# Patient Record
Sex: MALE | Race: WHITE | HISPANIC OR LATINO | Employment: FULL TIME | URBAN - METROPOLITAN AREA
[De-identification: names, ages, dates, MRNs, and addresses within clinical notes are randomized per-mention and may not be internally consistent; named-entity substitution may affect disease eponyms.]

---

## 2021-08-15 ENCOUNTER — HOSPITAL ENCOUNTER (INPATIENT)
Facility: MEDICAL CENTER | Age: 56
LOS: 16 days | DRG: 177 | End: 2021-08-31
Attending: INTERNAL MEDICINE | Admitting: HOSPITALIST
Payer: OTHER GOVERNMENT

## 2021-08-15 ENCOUNTER — APPOINTMENT (OUTPATIENT)
Dept: RADIOLOGY | Facility: MEDICAL CENTER | Age: 56
DRG: 177 | End: 2021-08-15
Attending: HOSPITALIST
Payer: OTHER GOVERNMENT

## 2021-08-15 DIAGNOSIS — J96.01 ACUTE RESPIRATORY FAILURE WITH HYPOXIA (HCC): ICD-10-CM

## 2021-08-15 DIAGNOSIS — J12.82 PNEUMONIA DUE TO COVID-19 VIRUS: ICD-10-CM

## 2021-08-15 DIAGNOSIS — U07.1 PNEUMONIA DUE TO COVID-19 VIRUS: ICD-10-CM

## 2021-08-15 LAB
ALBUMIN SERPL BCP-MCNC: 3 G/DL (ref 3.2–4.9)
ALBUMIN/GLOB SERPL: 0.9 G/DL
ALP SERPL-CCNC: 54 U/L (ref 30–99)
ALT SERPL-CCNC: 156 U/L (ref 2–50)
ANION GAP SERPL CALC-SCNC: 13 MMOL/L (ref 7–16)
AST SERPL-CCNC: 153 U/L (ref 12–45)
BASOPHILS # BLD AUTO: 0 % (ref 0–1.8)
BASOPHILS # BLD: 0 K/UL (ref 0–0.12)
BILIRUB SERPL-MCNC: 0.7 MG/DL (ref 0.1–1.5)
BUN SERPL-MCNC: 21 MG/DL (ref 8–22)
CALCIUM SERPL-MCNC: 7.9 MG/DL (ref 8.4–10.2)
CHLORIDE SERPL-SCNC: 101 MMOL/L (ref 96–112)
CO2 SERPL-SCNC: 21 MMOL/L (ref 20–33)
CREAT SERPL-MCNC: 0.76 MG/DL (ref 0.5–1.4)
CRP SERPL HS-MCNC: 19.85 MG/DL (ref 0–0.75)
D DIMER PPP IA.FEU-MCNC: 1.54 UG/ML (FEU) (ref 0–0.5)
EOSINOPHIL # BLD AUTO: 0 K/UL (ref 0–0.51)
EOSINOPHIL NFR BLD: 0 % (ref 0–6.9)
ERYTHROCYTE [DISTWIDTH] IN BLOOD BY AUTOMATED COUNT: 41 FL (ref 35.9–50)
GLOBULIN SER CALC-MCNC: 3.4 G/DL (ref 1.9–3.5)
GLUCOSE SERPL-MCNC: 141 MG/DL (ref 65–99)
HCT VFR BLD AUTO: 42.4 % (ref 42–52)
HGB BLD-MCNC: 14.8 G/DL (ref 14–18)
LYMPHOCYTES # BLD AUTO: 0.47 K/UL (ref 1–4.8)
LYMPHOCYTES NFR BLD: 7 % (ref 22–41)
MANUAL DIFF BLD: NORMAL
MCH RBC QN AUTO: 29.8 PG (ref 27–33)
MCHC RBC AUTO-ENTMCNC: 34.9 G/DL (ref 33.7–35.3)
MCV RBC AUTO: 85.3 FL (ref 81.4–97.8)
MONOCYTES # BLD AUTO: 0.2 K/UL (ref 0–0.85)
MONOCYTES NFR BLD AUTO: 3 % (ref 0–13.4)
NEUTROPHILS # BLD AUTO: 6.03 K/UL (ref 1.82–7.42)
NEUTROPHILS NFR BLD: 88 % (ref 44–72)
NEUTS BAND NFR BLD MANUAL: 2 % (ref 0–10)
NRBC # BLD AUTO: 0 K/UL
NRBC BLD-RTO: 0 /100 WBC
PLATELET # BLD AUTO: 156 K/UL (ref 164–446)
PLATELET BLD QL SMEAR: NORMAL
PMV BLD AUTO: 10.4 FL (ref 9–12.9)
POTASSIUM SERPL-SCNC: 3.7 MMOL/L (ref 3.6–5.5)
PROCALCITONIN SERPL-MCNC: 0.32 NG/ML
PROT SERPL-MCNC: 6.4 G/DL (ref 6–8.2)
RBC # BLD AUTO: 4.97 M/UL (ref 4.7–6.1)
RBC BLD AUTO: NORMAL
SODIUM SERPL-SCNC: 135 MMOL/L (ref 135–145)
WBC # BLD AUTO: 6.7 K/UL (ref 4.8–10.8)

## 2021-08-15 PROCEDURE — 93005 ELECTROCARDIOGRAM TRACING: CPT | Performed by: HOSPITALIST

## 2021-08-15 PROCEDURE — 85027 COMPLETE CBC AUTOMATED: CPT

## 2021-08-15 PROCEDURE — 99223 1ST HOSP IP/OBS HIGH 75: CPT | Mod: AI | Performed by: HOSPITALIST

## 2021-08-15 PROCEDURE — 85007 BL SMEAR W/DIFF WBC COUNT: CPT

## 2021-08-15 PROCEDURE — 80053 COMPREHEN METABOLIC PANEL: CPT

## 2021-08-15 PROCEDURE — 94760 N-INVAS EAR/PLS OXIMETRY 1: CPT

## 2021-08-15 PROCEDURE — 85379 FIBRIN DEGRADATION QUANT: CPT

## 2021-08-15 PROCEDURE — 94002 VENT MGMT INPAT INIT DAY: CPT

## 2021-08-15 PROCEDURE — 86140 C-REACTIVE PROTEIN: CPT

## 2021-08-15 PROCEDURE — 8E0ZXY6 ISOLATION: ICD-10-PCS | Performed by: HOSPITALIST

## 2021-08-15 PROCEDURE — 94660 CPAP INITIATION&MGMT: CPT

## 2021-08-15 PROCEDURE — 5A09357 ASSISTANCE WITH RESPIRATORY VENTILATION, LESS THAN 24 CONSECUTIVE HOURS, CONTINUOUS POSITIVE AIRWAY PRESSURE: ICD-10-PCS | Performed by: HOSPITALIST

## 2021-08-15 PROCEDURE — 770022 HCHG ROOM/CARE - ICU (200)

## 2021-08-15 PROCEDURE — 84145 PROCALCITONIN (PCT): CPT

## 2021-08-15 PROCEDURE — 71045 X-RAY EXAM CHEST 1 VIEW: CPT

## 2021-08-15 RX ORDER — GUAIFENESIN 600 MG/1
600 TABLET, EXTENDED RELEASE ORAL 2 TIMES DAILY PRN
Status: DISCONTINUED | OUTPATIENT
Start: 2021-08-15 | End: 2021-08-26

## 2021-08-15 RX ORDER — LABETALOL HYDROCHLORIDE 5 MG/ML
10 INJECTION, SOLUTION INTRAVENOUS EVERY 6 HOURS PRN
Status: DISCONTINUED | OUTPATIENT
Start: 2021-08-15 | End: 2021-08-31 | Stop reason: HOSPADM

## 2021-08-15 RX ORDER — DEXAMETHASONE 4 MG/1
6 TABLET ORAL DAILY
Status: COMPLETED | OUTPATIENT
Start: 2021-08-16 | End: 2021-08-25

## 2021-08-15 RX ORDER — ONDANSETRON 4 MG/1
4 TABLET, ORALLY DISINTEGRATING ORAL EVERY 6 HOURS PRN
Status: DISCONTINUED | OUTPATIENT
Start: 2021-08-15 | End: 2021-08-31 | Stop reason: HOSPADM

## 2021-08-15 RX ORDER — ZINC SULFATE 50(220)MG
220 CAPSULE ORAL DAILY
Status: DISCONTINUED | OUTPATIENT
Start: 2021-08-16 | End: 2021-08-16

## 2021-08-15 RX ORDER — ONDANSETRON 2 MG/ML
4 INJECTION INTRAMUSCULAR; INTRAVENOUS EVERY 6 HOURS PRN
Status: DISCONTINUED | OUTPATIENT
Start: 2021-08-15 | End: 2021-08-31 | Stop reason: HOSPADM

## 2021-08-15 RX ORDER — HYDRALAZINE HYDROCHLORIDE 20 MG/ML
10 INJECTION INTRAMUSCULAR; INTRAVENOUS EVERY 6 HOURS PRN
Status: DISCONTINUED | OUTPATIENT
Start: 2021-08-15 | End: 2021-08-31 | Stop reason: HOSPADM

## 2021-08-15 RX ORDER — ACETAMINOPHEN 325 MG/1
650 TABLET ORAL EVERY 6 HOURS PRN
Status: DISCONTINUED | OUTPATIENT
Start: 2021-08-15 | End: 2021-08-31 | Stop reason: HOSPADM

## 2021-08-15 RX ORDER — ASCORBIC ACID 500 MG
1000 TABLET ORAL DAILY
Status: DISCONTINUED | OUTPATIENT
Start: 2021-08-15 | End: 2021-08-16

## 2021-08-15 RX ORDER — VITAMIN B COMPLEX
1000 TABLET ORAL DAILY
Status: DISCONTINUED | OUTPATIENT
Start: 2021-08-16 | End: 2021-08-16

## 2021-08-15 ASSESSMENT — LIFESTYLE VARIABLES
EVER HAD A DRINK FIRST THING IN THE MORNING TO STEADY YOUR NERVES TO GET RID OF A HANGOVER: NO
HOW MANY TIMES IN THE PAST YEAR HAVE YOU HAD 5 OR MORE DRINKS IN A DAY: 0
HAVE PEOPLE ANNOYED YOU BY CRITICIZING YOUR DRINKING: NO
CONSUMPTION TOTAL: NEGATIVE
AVERAGE NUMBER OF DAYS PER WEEK YOU HAVE A DRINK CONTAINING ALCOHOL: 0
TOTAL SCORE: 0
ALCOHOL_USE: NO
SUBSTANCE_ABUSE: 0
HAVE YOU EVER FELT YOU SHOULD CUT DOWN ON YOUR DRINKING: NO
ON A TYPICAL DAY WHEN YOU DRINK ALCOHOL HOW MANY DRINKS DO YOU HAVE: 0
EVER FELT BAD OR GUILTY ABOUT YOUR DRINKING: NO

## 2021-08-15 ASSESSMENT — ENCOUNTER SYMPTOMS
WHEEZING: 0
FOCAL WEAKNESS: 0
MUSCULOSKELETAL NEGATIVE: 1
HEARTBURN: 0
NERVOUS/ANXIOUS: 0
HEADACHES: 0
CONSTIPATION: 0
CHILLS: 1
NAUSEA: 0
ABDOMINAL PAIN: 0
DEPRESSION: 0
PALPITATIONS: 0
BLOOD IN STOOL: 0
PSYCHIATRIC NEGATIVE: 1
FEVER: 1
DIZZINESS: 0
VOMITING: 0
DOUBLE VISION: 0
DIAPHORESIS: 0
SHORTNESS OF BREATH: 1
HEMOPTYSIS: 0
NEUROLOGICAL NEGATIVE: 1
GASTROINTESTINAL NEGATIVE: 1
SEIZURES: 0
DIARRHEA: 0
CARDIOVASCULAR NEGATIVE: 1
COUGH: 1
EYES NEGATIVE: 1
LOSS OF CONSCIOUSNESS: 0
BRUISES/BLEEDS EASILY: 0

## 2021-08-15 ASSESSMENT — COGNITIVE AND FUNCTIONAL STATUS - GENERAL
DRESSING REGULAR UPPER BODY CLOTHING: A LITTLE
DAILY ACTIVITIY SCORE: 22
SUGGESTED CMS G CODE MODIFIER MOBILITY: CH
SUGGESTED CMS G CODE MODIFIER DAILY ACTIVITY: CJ
MOBILITY SCORE: 24
HELP NEEDED FOR BATHING: A LITTLE

## 2021-08-15 ASSESSMENT — PATIENT HEALTH QUESTIONNAIRE - PHQ9
2. FEELING DOWN, DEPRESSED, IRRITABLE, OR HOPELESS: NOT AT ALL
SUM OF ALL RESPONSES TO PHQ9 QUESTIONS 1 AND 2: 0
1. LITTLE INTEREST OR PLEASURE IN DOING THINGS: NOT AT ALL

## 2021-08-15 ASSESSMENT — PULMONARY FUNCTION TESTS: EPAP_CMH2O: 8

## 2021-08-15 ASSESSMENT — PAIN DESCRIPTION - PAIN TYPE: TYPE: ACUTE PAIN

## 2021-08-16 PROBLEM — D69.6 THROMBOCYTOPENIA (HCC): Status: ACTIVE | Noted: 2021-08-16

## 2021-08-16 LAB — EKG IMPRESSION: NORMAL

## 2021-08-16 PROCEDURE — 700102 HCHG RX REV CODE 250 W/ 637 OVERRIDE(OP): Performed by: HOSPITALIST

## 2021-08-16 PROCEDURE — 700111 HCHG RX REV CODE 636 W/ 250 OVERRIDE (IP): Performed by: INTERNAL MEDICINE

## 2021-08-16 PROCEDURE — 94640 AIRWAY INHALATION TREATMENT: CPT

## 2021-08-16 PROCEDURE — 770022 HCHG ROOM/CARE - ICU (200)

## 2021-08-16 PROCEDURE — 93010 ELECTROCARDIOGRAM REPORT: CPT | Performed by: INTERNAL MEDICINE

## 2021-08-16 PROCEDURE — 94660 CPAP INITIATION&MGMT: CPT

## 2021-08-16 PROCEDURE — 99291 CRITICAL CARE FIRST HOUR: CPT | Performed by: INTERNAL MEDICINE

## 2021-08-16 PROCEDURE — 94003 VENT MGMT INPAT SUBQ DAY: CPT

## 2021-08-16 PROCEDURE — XW033H5 INTRODUCTION OF TOCILIZUMAB INTO PERIPHERAL VEIN, PERCUTANEOUS APPROACH, NEW TECHNOLOGY GROUP 5: ICD-10-PCS | Performed by: INTERNAL MEDICINE

## 2021-08-16 PROCEDURE — 700105 HCHG RX REV CODE 258: Performed by: INTERNAL MEDICINE

## 2021-08-16 PROCEDURE — 94760 N-INVAS EAR/PLS OXIMETRY 1: CPT

## 2021-08-16 PROCEDURE — 700111 HCHG RX REV CODE 636 W/ 250 OVERRIDE (IP): Performed by: HOSPITALIST

## 2021-08-16 PROCEDURE — A9270 NON-COVERED ITEM OR SERVICE: HCPCS | Performed by: HOSPITALIST

## 2021-08-16 RX ADMIN — TOCILIZUMAB 640 MG: 20 INJECTION, SOLUTION, CONCENTRATE INTRAVENOUS at 10:48

## 2021-08-16 RX ADMIN — ZINC SULFATE 220 MG (50 MG) CAPSULE 220 MG: CAPSULE at 06:11

## 2021-08-16 RX ADMIN — OXYCODONE HYDROCHLORIDE AND ACETAMINOPHEN 1000 MG: 500 TABLET ORAL at 06:11

## 2021-08-16 RX ADMIN — ENOXAPARIN SODIUM 40 MG: 40 INJECTION SUBCUTANEOUS at 17:43

## 2021-08-16 RX ADMIN — DEXAMETHASONE 6 MG: 4 TABLET ORAL at 06:11

## 2021-08-16 RX ADMIN — Medication 1000 UNITS: at 06:11

## 2021-08-16 ASSESSMENT — ENCOUNTER SYMPTOMS
EYES NEGATIVE: 1
SHORTNESS OF BREATH: 1
MUSCULOSKELETAL NEGATIVE: 1
WEAKNESS: 1
PSYCHIATRIC NEGATIVE: 1
GASTROINTESTINAL NEGATIVE: 1
CARDIOVASCULAR NEGATIVE: 1

## 2021-08-16 ASSESSMENT — PAIN DESCRIPTION - PAIN TYPE
TYPE: ACUTE PAIN

## 2021-08-16 ASSESSMENT — PULMONARY FUNCTION TESTS
EPAP_CMH2O: 8
EPAP_CMH2O: 8

## 2021-08-16 NOTE — PROGRESS NOTES
Pulmonary Progress Note    Date of admission  8/15/2021    Chief Complaint  56 y.o. male admitted 8/15/2021 with Covid pna    Hospital Course  55 yo male transferred from [place] with worsening SOB dx covid 8/8/21 and b/l infiltrates and high o2 requirement. Required BIPAP and transferred to ICU for further management    Interval Problem Update  Reviewed last 24 hour events:  Chart review from the past 24 hours includes imaging, laboratory studies, vital signs and notes available.  Pertinent data for today    Cardiac:  BP vitals stable  Pulm:  12/8 60% BIPAP  Neuro:  intact  Heme:  Not bleeding  I/O:  340/900  ID: COVID pna  On decadron 8/15  CRP 19.85  GI/endo:  Able to eat  Labs/Imaging:  reviewed  Lines:  none  Mobility:  Prone q 2 hrs  Symptoms:see below      Review of Systems  Review of Systems   Constitutional: Positive for malaise/fatigue.   HENT: Negative.    Eyes: Negative.    Respiratory: Positive for shortness of breath.    Cardiovascular: Negative.    Gastrointestinal: Negative.    Genitourinary: Negative.    Musculoskeletal: Negative.    Skin: Negative.    Neurological: Positive for weakness.   Endo/Heme/Allergies: Negative.    Psychiatric/Behavioral: Negative.         Vital Signs for last 24 hours   Temp:  [36.2 °C (97.2 °F)-36.7 °C (98.1 °F)] 36.4 °C (97.5 °F)  Pulse:  [57-82] 57  Resp:  [12-55] 21  BP: (116-165)/() 116/78  SpO2:  [88 %-99 %] 96 %         Physical Exam   Physical Exam  Constitutional:       Appearance: He is ill-appearing.   HENT:      Head: Normocephalic and atraumatic.      Nose: Nose normal.   Eyes:      Pupils: Pupils are equal, round, and reactive to light.   Cardiovascular:      Rate and Rhythm: Normal rate.   Pulmonary:      Comments: tacypnea  On BIPAP  Abdominal:      General: Bowel sounds are normal.      Palpations: Abdomen is soft.   Musculoskeletal:         General: No tenderness.   Skin:     General: Skin is warm and dry.   Neurological:      General: No  focal deficit present.      Mental Status: He is alert and oriented to person, place, and time.   Psychiatric:         Mood and Affect: Mood normal.         Behavior: Behavior normal.         Medications  Current Facility-Administered Medications   Medication Dose Route Frequency Provider Last Rate Last Admin   • ondansetron (ZOFRAN) syringe/vial injection 4 mg  4 mg Intravenous Q6HRS PRN Rohini Lr M.D.       • ondansetron (ZOFRAN ODT) dispertab 4 mg  4 mg Oral Q6HRS PRN Rohini Lr M.D.       • enoxaparin (LOVENOX) inj 40 mg  40 mg Subcutaneous DAILY Rohini Lr M.D.       • acetaminophen (Tylenol) tablet 650 mg  650 mg Oral Q6HRS PRN Rohini Lr M.D.       • labetalol (NORMODYNE/TRANDATE) injection 10 mg  10 mg Intravenous Q6HRS PRN Rohini Lr M.D.       • hydrALAZINE (APRESOLINE) injection 10 mg  10 mg Intravenous Q6HRS PRN Rohini Lr M.D.       • guaiFENesin ER (MUCINEX) tablet 600 mg  600 mg Oral BID PRN Rohini Lr M.D.       • dexamethasone (DECADRON) tablet 6 mg  6 mg Oral DAILY Rohini Lr M.D.   6 mg at 08/16/21 0611       Fluids    Intake/Output Summary (Last 24 hours) at 8/16/2021 1637  Last data filed at 8/16/2021 1400  Gross per 24 hour   Intake 340 ml   Output 900 ml   Net -560 ml       Laboratory          Recent Labs     08/15/21  2200   SODIUM 135   POTASSIUM 3.7   CHLORIDE 101   CO2 21   BUN 21   CREATININE 0.76   CALCIUM 7.9*     Recent Labs     08/15/21  2200   ALTSGPT 156*   ASTSGOT 153*   ALKPHOSPHAT 54   TBILIRUBIN 0.7   GLUCOSE 141*     Recent Labs     08/15/21  2200   WBC 6.7   NEUTSPOLYS 88.00*   LYMPHOCYTES 7.00*   MONOCYTES 3.00   EOSINOPHILS 0.00   BASOPHILS 0.00   ASTSGOT 153*   ALTSGPT 156*   ALKPHOSPHAT 54   TBILIRUBIN 0.7     Recent Labs     08/15/21  2200   RBC 4.97   HEMOGLOBIN 14.8   HEMATOCRIT 42.4   PLATELETCT 156*       Imaging  B/l infiltrates mainly peripherally    Assessment/Plan  * Pneumonia due to COVID-19 virus  Assessment & Plan  With  hypoxemia needing bipap- transition to hiflo  Does not qualify for remdesivir  Qualifies for tocilimuzab and counseled pt on tocilimuzab  On decadron started 8/15  D dimer high - doppler LE  Strict droplet isolation  pronning q 2 hrs with 16 hrs if possible    Thrombocytopenia (HCC)  Assessment & Plan  Platelets 156 k  monitor       VTE:  Lovenox  Ulcer: Not Indicated  Lines: None    I have performed a physical exam and reviewed and updated ROS and Plan today (8/16/2021). In review of yesterday's note (8/15/2021), there are no changes except as documented above.     Discussed patient condition and risk of morbidity and/or mortality with Charge nurse / hot rounds  The patient remains critically ill.  Critical care time = 38 minutes in directly providing and coordinating critical care and extensive data review.  No time overlap and excludes procedures.

## 2021-08-16 NOTE — PROGRESS NOTES
Assumed patient care from Meeta BLACKWELL. Patient assessed and denies pain. Call light and belongings within reach. Patient's daughter updated on patient's status. All safety measures in place.

## 2021-08-16 NOTE — H&P
Hospital Medicine History & Physical Note    Date of Service  8/15/2021    Primary Care Physician  No primary care provider on file.    Consultants  critical care    Specialist Names: Aaron    Code Status  Full Code    Chief Complaint  Shortness of breath    History of Presenting Illness  Venkat Thomson is a 56 y.o. male who presented 8/15/2021 with shortness of breath to the [facility] emergency room today.  On 8/8/2021 he was diagnosed with COVID-19 pneumonia and then was sent home on 8/9/2021.  At home the patient decompensated and now is with bilateral worsening pneumonia as well as shortness of breath.  Patient thus was transferred to [facility] for higher level of care.  Patient currently is admitted to the ICU due to high oxygen requirements and requiring BiPAP.  Patient will be started on Decadron, patient will be started also on vitamin D, zinc and vitamin C.  The patient at this point will be on Lovenox for anticoagulation.  BiPAP settings will be adjusted to keep oxygen saturations above 90%..    I discussed the plan of care with patient, family and bedside RN.    Review of Systems  Review of Systems   Constitutional: Positive for chills, fever and malaise/fatigue. Negative for diaphoresis.   HENT: Negative.    Eyes: Negative.  Negative for double vision.   Respiratory: Positive for cough and shortness of breath. Negative for hemoptysis and wheezing.    Cardiovascular: Negative.  Negative for chest pain, palpitations and leg swelling.   Gastrointestinal: Negative.  Negative for abdominal pain, blood in stool, constipation, diarrhea, heartburn, nausea and vomiting.   Genitourinary: Negative.  Negative for frequency, hematuria and urgency.   Musculoskeletal: Negative.  Negative for joint pain.   Skin: Negative.  Negative for itching and rash.   Neurological: Negative.  Negative for dizziness, focal weakness, seizures, loss of consciousness and headaches.   Endo/Heme/Allergies: Negative.  Does not  bruise/bleed easily.   Psychiatric/Behavioral: Negative.  Negative for depression, substance abuse and suicidal ideas. The patient is not nervous/anxious.    All other systems reviewed and are negative.      Past Medical History  Patient reports no past medical problems and does not take any medications.    Surgical History  Reports no past surgical problems.    Family History  Family history reviewed and noncontributory.  Family history reviewed with patient. There is no family history that is pertinent to the chief complaint.     Social History  Patient reports no smoking, no alcohol no drug use.    Allergies  No Known Allergies    Medications  None       Physical Exam  Temp:  [36.3 °C (97.3 °F)] 36.3 °C (97.3 °F)  Pulse:  [67-72] 67  Resp:  [31-35] 35  SpO2:  [93 %-95 %] 95 %    Physical Exam  Vitals and nursing note reviewed. Exam conducted with a chaperone present.   Constitutional:       General: He is not in acute distress.     Appearance: Normal appearance. He is well-developed and normal weight. He is not ill-appearing, toxic-appearing or diaphoretic.      Interventions: Face mask in place.   HENT:      Head: Normocephalic and atraumatic.      Right Ear: External ear normal.      Left Ear: External ear normal.      Nose: Nose normal.      Mouth/Throat:      Mouth: Mucous membranes are moist.      Pharynx: Oropharynx is clear.   Eyes:      Extraocular Movements: Extraocular movements intact.      Conjunctiva/sclera: Conjunctivae normal.      Pupils: Pupils are equal, round, and reactive to light.   Neck:      Thyroid: No thyromegaly.      Vascular: No JVD.   Cardiovascular:      Rate and Rhythm: Normal rate and regular rhythm.      Pulses: Normal pulses.      Heart sounds: Normal heart sounds.   Pulmonary:      Effort: Accessory muscle usage present.      Breath sounds: Decreased air movement present. Examination of the right-upper field reveals decreased breath sounds. Examination of the left-upper field  reveals decreased breath sounds. Examination of the right-middle field reveals decreased breath sounds and rhonchi. Examination of the left-middle field reveals decreased breath sounds and rhonchi. Examination of the right-lower field reveals decreased breath sounds and rhonchi. Examination of the left-lower field reveals decreased breath sounds and rhonchi. Decreased breath sounds and rhonchi present.   Chest:      Chest wall: No tenderness.   Abdominal:      General: Abdomen is flat. Bowel sounds are normal. There is no distension.      Palpations: Abdomen is soft. There is no mass.      Tenderness: There is no abdominal tenderness. There is no guarding or rebound.   Musculoskeletal:         General: Normal range of motion.      Cervical back: Normal range of motion and neck supple.      Right lower leg: No edema.      Left lower leg: No edema.   Lymphadenopathy:      Cervical: No cervical adenopathy.   Skin:     General: Skin is warm and dry.      Capillary Refill: Capillary refill takes more than 3 seconds.      Findings: No rash.   Neurological:      General: No focal deficit present.      Mental Status: He is alert and oriented to person, place, and time. Mental status is at baseline.      GCS: GCS eye subscore is 4. GCS verbal subscore is 5. GCS motor subscore is 6.      Cranial Nerves: No cranial nerve deficit.      Deep Tendon Reflexes: Reflexes are normal and symmetric.   Psychiatric:         Mood and Affect: Mood normal.         Behavior: Behavior normal.         Thought Content: Thought content normal.         Judgment: Judgment normal.         Laboratory:          No results for input(s): ALTSGPT, ASTSGOT, ALKPHOSPHAT, TBILIRUBIN, DBILIRUBIN, GAMMAGT, AMYLASE, LIPASE, ALB, PREALBUMIN, GLUCOSE in the last 72 hours.      No results for input(s): NTPROBNP in the last 72 hours.      No results for input(s): TROPONINT in the last 72 hours.    Imaging:  DX-CHEST-PORTABLE (1 VIEW)    (Results Pending)       no  X-Ray or EKG requiring interpretation    Assessment/Plan:  I anticipate this patient will require at least two midnights for appropriate medical management, necessitating inpatient admission.    * Pneumonia due to COVID-19 virus  Assessment & Plan  Patient 1 week ago, on 8/8/2021, started having fevers and chills.  He went to the local emergency room in [place].  There they diagnosed him with COVID-19 infection.  He was sent home on 8/9/2021.  Today he got worse with his respiratory status and thus went to the emergency room again.  They had to put him on BiPAP due to the hypoxia and the transferred him to [facility].  Here he is admitted to the ICU due to the need of BiPAP.  Going to start him on Decadron, zinc, vitamin C, vitamin D.  Patient will be placed on prophylactic anticoagulation with Lovenox  Patient is unvaccinated.  Patient currently requires high amounts of oxygen.    Acute respiratory failure with hypoxia (HCC)  Assessment & Plan  Secondary to bilateral COVID-19 infection  Continue at this point with BiPAP  Optimize oxygen use to keep oxygen saturations above 90%.      VTE prophylaxis: enoxaparin ppx

## 2021-08-16 NOTE — CARE PLAN
The patient is Watcher - Medium risk of patient condition declining or worsening         Progress made toward(s) clinical / shift goals:        Problem: Psychosocial  Goal: Patient's level of anxiety will decrease  Outcome: Progressing  Goal: Patient's ability to verbalize feelings about condition will improve  Outcome: Progressing     Problem: Hemodynamics  Goal: Patient's hemodynamics, fluid balance and neurologic status will be stable or improve  Outcome: Progressing     Problem: Respiratory  Goal: Patient will achieve/maintain optimum respiratory ventilation and gas exchange  Outcome: Progressing     Problem: Risk for Aspiration  Goal: Patient's risk for aspiration will be absent or decrease  Outcome: Progressing     Problem: Infection - Standard  Goal: Patient will remain free from infection  Outcome: Progressing

## 2021-08-16 NOTE — FLOWSHEET NOTE
08/16/21 0734   Vital Signs   Pulse 65   Respiration (!) 29   Pulse Oximetry 89 %   $ Pulse Oximetry (Spot Check) Yes   Oxygen   FiO2% 50 %   O2 Delivery Device BIPAP

## 2021-08-16 NOTE — DISCHARGE PLANNING
Anticipated Discharge Disposition:   Home when medically cleared possibly with DME oxygen     Action:   Chart review complete.     Per chart review, this patient was transferred from [place] to here due to pneumonia related to COVID. Patient is currently on HFNC.     Per chart review of PFA notes and [place] facesheet, patient is self pay and does not quality for medicaid.     In the event of an emergency, the patient's NOK is his daughter,  Janel Thomson.     Barriers to Discharge:   Medical Clearance  Possible DME oxygen    Plan:   Hospital care management will continue to assist with discharge planning needs.

## 2021-08-16 NOTE — ASSESSMENT & PLAN NOTE
Needed bipap, transferred to floor 8/17, back to ICU 8/22 maxed on hiflo 100%/60 L and NRB  Decadron 8/15  Tocilimuzab 8/16  Transamininits but improving, remdesivir not ideal  D dimer > 20 -> nosebleeds resolved. Risks/benefits discussed, start therapeutic lovenox  Increase lasix 40mg IV BID  Strict droplet and contact isolation  Cont prone/pray positioning 16:8

## 2021-08-16 NOTE — PROGRESS NOTES
Patient received on BIPAP at 100% FiO2,  brought by REACH team. Report from [place] over the phone that he had received Levaquin, Lovenox, Dexamethasone and LR- MD informed. Patient and daughter oriented to room, use of call light and plan of care.

## 2021-08-16 NOTE — FLOWSHEET NOTE
08/16/21 0950   Events/Summary/Plan   Events/Summary/Plan Patient prone   Vital Signs   Pulse 71   Respiration (!) 55   Pulse Oximetry 96 %   $ Pulse Oximetry (Spot Check) Yes   Oxygen   O2 (LPM) 50   FiO2% 50 %   O2 Delivery Device High Flow Nasal Cannula   Aerosols   $ Aerosol Delivery Device Heated High Flow Nasal Cannula   Aerosol Temperature 31 °C (87.8 °F)

## 2021-08-16 NOTE — ASSESSMENT & PLAN NOTE
Patient 1 week ago, on 8/8/2021, started having fevers and chills.  He went to the local emergency room in [place].  There they diagnosed him with COVID-19 infection.  He was sent home on 8/9/2021.  Completed Decadron  DVT prophylaxis dose of Lovenox   Patient is unvaccinated.

## 2021-08-16 NOTE — FLOWSHEET NOTE
08/16/21 1432   Events/Summary/Plan   Events/Summary/Plan patient prone   Vital Signs   Pulse (!) 59   Respiration 20   Pulse Oximetry 97 %   $ Pulse Oximetry (Spot Check) Yes   Oxygen   O2 (LPM) 50   FiO2% 50 %   O2 Delivery Device Heated High Flow Nasal Cannula   Aerosols   $ Aerosol Delivery Device Heated High Flow Nasal Cannula   Aerosol Temperature 31 °C (87.8 °F)

## 2021-08-16 NOTE — ASSESSMENT & PLAN NOTE
Secondary to bilateral COVID-19 infection  Weaned down to nasal cannula.  Optimize oxygen use to keep oxygen saturations above 90%.

## 2021-08-16 NOTE — FLOWSHEET NOTE
08/16/21 0702   Events/Summary/Plan   Events/Summary/Plan FiO2 titrated to 50%,  RN aware.   Ventilator Management Group   Intensivist Group Yes   Vent Settings   FiO2% 50 %   NIV for Respiratory Failure   $ NIV Charge Yes   Non-Invasive Vent Mode ST   IPAP (cmH2O) 12   EPAP (cm H2O) 8   FiO2 50   Alarms Set / Checked Yes   Non-Invasive Temp (C) 31   Respiratory Rate Patient 25   Spontaneous Vt (ml) 635   Spontaneous Ve (LPM) 16   NIV Interface Full Mask

## 2021-08-16 NOTE — FLOWSHEET NOTE
08/16/21 0755   Events/Summary/Plan   Events/Summary/Plan Patient taken off BiPAP and placed on HFNC per MD.   Vital Signs   Pulse 64   Respiration (!) 29   Pulse Oximetry 92 %   $ Pulse Oximetry (Spot Check) Yes   Respiratory Assessment   Level of Consciousness Alert   Oxygen   O2 (LPM) 50   FiO2% 60 %   O2 Delivery Device Heated High Flow Nasal Cannula   Aerosols   $ Aerosol Delivery Device Heated High Flow Nasal Cannula   Aerosol Temperature 30 °C (86 °F)   Equipment Change Date 08/23/21

## 2021-08-16 NOTE — CARE PLAN
The patient is Stable - Low risk of patient condition declining or worsening    Shift Goals  Clinical Goals: Decrease oxygen demands and prone    Progress made toward(s) clinical / shift goals:      Patient is not progressing towards the following goals:    Problem: Knowledge Deficit - Standard  Goal: Patient and family/care givers will demonstrate understanding of plan of care, disease process/condition, diagnostic tests and medications  Outcome: Progressing     Problem: Psychosocial  Goal: Patient's level of anxiety will decrease  Outcome: Progressing  Goal: Patient's ability to verbalize feelings about condition will improve  Outcome: Progressing     Problem: Communication  Goal: The ability to communicate needs accurately and effectively will improve  Outcome: Progressing     Problem: Discharge Barriers/Planning  Goal: Patient's continuum of care needs are met  Outcome: Progressing     Problem: Hemodynamics  Goal: Patient's hemodynamics, fluid balance and neurologic status will be stable or improve  Outcome: Progressing     Problem: Respiratory  Goal: Patient will achieve/maintain optimum respiratory ventilation and gas exchange  Outcome: Progressing     Problem: Risk for Aspiration  Goal: Patient's risk for aspiration will be absent or decrease  Outcome: Progressing     Problem: Nutrition  Goal: Patient's nutritional and fluid intake will be adequate or improve  Outcome: Progressing     Problem: Urinary Elimination  Goal: Establish and maintain regular urinary output  Outcome: Progressing     Problem: Mobility  Goal: Patient's capacity to carry out activities will improve  Outcome: Progressing     Problem: Self Care  Goal: Patient will have the ability to perform ADLs independently or with assistance (bathe, groom, dress, toilet and feed)  Outcome: Progressing

## 2021-08-17 ENCOUNTER — APPOINTMENT (OUTPATIENT)
Dept: RADIOLOGY | Facility: MEDICAL CENTER | Age: 56
DRG: 177 | End: 2021-08-17
Attending: INTERNAL MEDICINE
Payer: OTHER GOVERNMENT

## 2021-08-17 LAB
ALBUMIN SERPL BCP-MCNC: 3 G/DL (ref 3.2–4.9)
ALBUMIN/GLOB SERPL: 0.8 G/DL
ALP SERPL-CCNC: 63 U/L (ref 30–99)
ALT SERPL-CCNC: 248 U/L (ref 2–50)
ANION GAP SERPL CALC-SCNC: 11 MMOL/L (ref 7–16)
AST SERPL-CCNC: 159 U/L (ref 12–45)
BASOPHILS # BLD AUTO: 0 % (ref 0–1.8)
BASOPHILS # BLD: 0 K/UL (ref 0–0.12)
BILIRUB SERPL-MCNC: 0.6 MG/DL (ref 0.1–1.5)
BUN SERPL-MCNC: 29 MG/DL (ref 8–22)
CALCIUM SERPL-MCNC: 8.4 MG/DL (ref 8.4–10.2)
CHLORIDE SERPL-SCNC: 101 MMOL/L (ref 96–112)
CO2 SERPL-SCNC: 23 MMOL/L (ref 20–33)
CREAT SERPL-MCNC: 0.64 MG/DL (ref 0.5–1.4)
D DIMER PPP IA.FEU-MCNC: 1.45 UG/ML (FEU) (ref 0–0.5)
EOSINOPHIL # BLD AUTO: 0 K/UL (ref 0–0.51)
EOSINOPHIL NFR BLD: 0 % (ref 0–6.9)
ERYTHROCYTE [DISTWIDTH] IN BLOOD BY AUTOMATED COUNT: 41.2 FL (ref 35.9–50)
GLOBULIN SER CALC-MCNC: 3.7 G/DL (ref 1.9–3.5)
GLUCOSE SERPL-MCNC: 139 MG/DL (ref 65–99)
HCT VFR BLD AUTO: 45.8 % (ref 42–52)
HGB BLD-MCNC: 16 G/DL (ref 14–18)
LYMPHOCYTES # BLD AUTO: 0.62 K/UL (ref 1–4.8)
LYMPHOCYTES NFR BLD: 8 % (ref 22–41)
MANUAL DIFF BLD: NORMAL
MCH RBC QN AUTO: 30 PG (ref 27–33)
MCHC RBC AUTO-ENTMCNC: 34.9 G/DL (ref 33.7–35.3)
MCV RBC AUTO: 85.9 FL (ref 81.4–97.8)
MONOCYTES # BLD AUTO: 0.15 K/UL (ref 0–0.85)
MONOCYTES NFR BLD AUTO: 2 % (ref 0–13.4)
NEUTROPHILS # BLD AUTO: 6.93 K/UL (ref 1.82–7.42)
NEUTROPHILS NFR BLD: 87 % (ref 44–72)
NEUTS BAND NFR BLD MANUAL: 3 % (ref 0–10)
NRBC # BLD AUTO: 0 K/UL
NRBC BLD-RTO: 0 /100 WBC
PLATELET # BLD AUTO: 268 K/UL (ref 164–446)
PLATELET BLD QL SMEAR: NORMAL
PMV BLD AUTO: 9.8 FL (ref 9–12.9)
POTASSIUM SERPL-SCNC: 4.3 MMOL/L (ref 3.6–5.5)
PROT SERPL-MCNC: 6.7 G/DL (ref 6–8.2)
RBC # BLD AUTO: 5.33 M/UL (ref 4.7–6.1)
RBC BLD AUTO: NORMAL
SODIUM SERPL-SCNC: 135 MMOL/L (ref 135–145)
WBC # BLD AUTO: 7.7 K/UL (ref 4.8–10.8)

## 2021-08-17 PROCEDURE — 94760 N-INVAS EAR/PLS OXIMETRY 1: CPT

## 2021-08-17 PROCEDURE — 700102 HCHG RX REV CODE 250 W/ 637 OVERRIDE(OP): Performed by: HOSPITALIST

## 2021-08-17 PROCEDURE — 85379 FIBRIN DEGRADATION QUANT: CPT

## 2021-08-17 PROCEDURE — 700102 HCHG RX REV CODE 250 W/ 637 OVERRIDE(OP): Performed by: INTERNAL MEDICINE

## 2021-08-17 PROCEDURE — 99233 SBSQ HOSP IP/OBS HIGH 50: CPT | Performed by: INTERNAL MEDICINE

## 2021-08-17 PROCEDURE — A9270 NON-COVERED ITEM OR SERVICE: HCPCS | Performed by: INTERNAL MEDICINE

## 2021-08-17 PROCEDURE — A9270 NON-COVERED ITEM OR SERVICE: HCPCS | Performed by: HOSPITALIST

## 2021-08-17 PROCEDURE — 80053 COMPREHEN METABOLIC PANEL: CPT

## 2021-08-17 PROCEDURE — 700111 HCHG RX REV CODE 636 W/ 250 OVERRIDE (IP): Performed by: HOSPITALIST

## 2021-08-17 PROCEDURE — 85007 BL SMEAR W/DIFF WBC COUNT: CPT

## 2021-08-17 PROCEDURE — 85027 COMPLETE CBC AUTOMATED: CPT

## 2021-08-17 PROCEDURE — 770001 HCHG ROOM/CARE - MED/SURG/GYN PRIV*

## 2021-08-17 PROCEDURE — 93970 EXTREMITY STUDY: CPT

## 2021-08-17 PROCEDURE — 94640 AIRWAY INHALATION TREATMENT: CPT

## 2021-08-17 RX ORDER — BENZONATATE 100 MG/1
100 CAPSULE ORAL 3 TIMES DAILY PRN
Status: DISCONTINUED | OUTPATIENT
Start: 2021-08-17 | End: 2021-08-31 | Stop reason: HOSPADM

## 2021-08-17 RX ADMIN — GUAIFENESIN 600 MG: 600 TABLET, EXTENDED RELEASE ORAL at 22:11

## 2021-08-17 RX ADMIN — ENOXAPARIN SODIUM 40 MG: 40 INJECTION SUBCUTANEOUS at 17:52

## 2021-08-17 RX ADMIN — DEXAMETHASONE 6 MG: 4 TABLET ORAL at 05:33

## 2021-08-17 RX ADMIN — GUAIFENESIN 600 MG: 600 TABLET, EXTENDED RELEASE ORAL at 10:55

## 2021-08-17 ASSESSMENT — ENCOUNTER SYMPTOMS
NERVOUS/ANXIOUS: 1
GASTROINTESTINAL NEGATIVE: 1
WEAKNESS: 1
SHORTNESS OF BREATH: 1
MUSCULOSKELETAL NEGATIVE: 1
EYES NEGATIVE: 1
CARDIOVASCULAR NEGATIVE: 1

## 2021-08-17 ASSESSMENT — PAIN DESCRIPTION - PAIN TYPE
TYPE: ACUTE PAIN

## 2021-08-17 NOTE — PROGRESS NOTES
Assumed patient care from ROSALVA Tim. Assessment completed. Plan of care discussed, questions and concerns addressed, call light within reach. Patient resting in prone.

## 2021-08-17 NOTE — CARE PLAN
The patient is Watcher - Medium risk of patient condition declining or worsening    Shift Goals  Clinical Goals: decrease o2 demand  Patient Goals: improve breathing    Progress made toward(s) clinical / shift goals:        Problem: Psychosocial  Goal: Patient's level of anxiety will decrease  Outcome: Progressing  Goal: Patient's ability to verbalize feelings about condition will improve  Outcome: Progressing     Problem: Hemodynamics  Goal: Patient's hemodynamics, fluid balance and neurologic status will be stable or improve  8/16/2021 1938 by Meeta Gao R.N.  Outcome: Progressing  8/16/2021 0638 by Meeta Gao R.N.  Outcome: Progressing     Problem: Respiratory  Goal: Patient will achieve/maintain optimum respiratory ventilation and gas exchange  8/16/2021 1938 by Meeta Gao R.N.  Outcome: Progressing  8/16/2021 0638 by Meeta Gao R.N.  Outcome: Progressing     Problem: Risk for Aspiration  Goal: Patient's risk for aspiration will be absent or decrease  Outcome: Progressing     Problem: Nutrition  Goal: Patient's nutritional and fluid intake will be adequate or improve  Outcome: Progressing     Problem: Mobility  Goal: Patient's capacity to carry out activities will improve  Outcome: Progressing     Problem: Self Care  Goal: Patient will have the ability to perform ADLs independently or with assistance (bathe, groom, dress, toilet and feed)  Outcome: Progressing     Problem: Infection - Standard  Goal: Patient will remain free from infection  Outcome: Progressing

## 2021-08-17 NOTE — PROGRESS NOTES
Pulmonary Progress Note    Date of admission  8/15/2021    Chief Complaint  56 y.o. male admitted 8/15/2021 with Covid pna    Hospital Course  55 yo male transferred from [place] with worsening SOB dx covid 8/8/21 and b/l infiltrates and high o2 requirement. Required BIPAP and transferred to ICU for further management    Interval Problem Update  Reviewed last 24 hour events:  Chart review from the past 24 hours includes imaging, laboratory studies, vital signs and notes available.  Pertinent data for today    Cardiac:  BP vitals stable  Pulm:  Has maintained to hiflo 50%60 l  Neuro:  intact  Heme:  Not bleeding  I/O:  540/1100  ID: COVID pna  On decadron 8/15  CRP 19.85 on 8/15 so received tocilimuzab on 8/16  GI/endo:  Able to eat  Labs/Imaging:  Requested cbc, cmp and d dimer  Lines:  none  Mobility:  Prone q 2 hrs  Symptoms:see below      Review of Systems  Review of Systems   Constitutional: Positive for malaise/fatigue.   HENT: Negative.    Eyes: Negative.    Respiratory: Positive for shortness of breath.    Cardiovascular: Negative.    Gastrointestinal: Negative.    Genitourinary: Negative.    Musculoskeletal: Negative.    Skin: Negative.    Neurological: Positive for weakness.   Endo/Heme/Allergies: Negative.    Psychiatric/Behavioral: The patient is nervous/anxious.         Vital Signs for last 24 hours   Temp:  [36.1 °C (97 °F)-36.6 °C (97.9 °F)] 36.1 °C (97 °F)  Pulse:  [47-71] 47  Resp:  [8-55] 24  BP: (109-162)/(74-99) 112/76  SpO2:  [83 %-99 %] 93 %         Physical Exam   Physical Exam  Constitutional:       Appearance: He is ill-appearing.   HENT:      Head: Normocephalic and atraumatic.      Nose: Nose normal.   Eyes:      Pupils: Pupils are equal, round, and reactive to light.   Cardiovascular:      Rate and Rhythm: Normal rate.   Pulmonary:      Comments: tacypnea  proned on hiflo  Abdominal:      General: Bowel sounds are normal.      Palpations: Abdomen is soft.   Musculoskeletal:          General: No tenderness.   Skin:     General: Skin is warm and dry.   Neurological:      General: No focal deficit present.      Mental Status: He is alert and oriented to person, place, and time.   Psychiatric:         Behavior: Behavior normal.      Comments: Very emotional and scared         Medications  Current Facility-Administered Medications   Medication Dose Route Frequency Provider Last Rate Last Admin   • ondansetron (ZOFRAN) syringe/vial injection 4 mg  4 mg Intravenous Q6HRS PRN Rohini Lr M.D.       • ondansetron (ZOFRAN ODT) dispertab 4 mg  4 mg Oral Q6HRS PRN Rohini Lr M.D.       • enoxaparin (LOVENOX) inj 40 mg  40 mg Subcutaneous DAILY Rohini Lr M.D.   40 mg at 08/16/21 1743   • acetaminophen (Tylenol) tablet 650 mg  650 mg Oral Q6HRS PRN Rohini Lr M.D.       • labetalol (NORMODYNE/TRANDATE) injection 10 mg  10 mg Intravenous Q6HRS PRN Rohini Lr M.D.       • hydrALAZINE (APRESOLINE) injection 10 mg  10 mg Intravenous Q6HRS PRN Rohini Lr M.D.       • guaiFENesin ER (MUCINEX) tablet 600 mg  600 mg Oral BID PRN Rohini Lr M.D.       • dexamethasone (DECADRON) tablet 6 mg  6 mg Oral DAILY Rohini Lr M.D.   6 mg at 08/17/21 0533       Fluids    Intake/Output Summary (Last 24 hours) at 8/17/2021 0750  Last data filed at 8/17/2021 0600  Gross per 24 hour   Intake 540 ml   Output 800 ml   Net -260 ml       Laboratory          Recent Labs     08/15/21  2200   SODIUM 135   POTASSIUM 3.7   CHLORIDE 101   CO2 21   BUN 21   CREATININE 0.76   CALCIUM 7.9*     Recent Labs     08/15/21  2200   ALTSGPT 156*   ASTSGOT 153*   ALKPHOSPHAT 54   TBILIRUBIN 0.7   GLUCOSE 141*     Recent Labs     08/15/21  2200   WBC 6.7   NEUTSPOLYS 88.00*   LYMPHOCYTES 7.00*   MONOCYTES 3.00   EOSINOPHILS 0.00   BASOPHILS 0.00   ASTSGOT 153*   ALTSGPT 156*   ALKPHOSPHAT 54   TBILIRUBIN 0.7     Recent Labs     08/15/21  2200   RBC 4.97   HEMOGLOBIN 14.8   HEMATOCRIT 42.4   PLATELETCT 156*        Imaging  B/l infiltrates mainly peripherally cxr on 8/15    Assessment/Plan  * Pneumonia due to COVID-19 virus- (present on admission)  Assessment & Plan  With hypoxemia needed bipap- transition to hiflo 8/16 50 l and 60%  Does not qualify for remdesivir  Qualifies for tocilimuzab and counseled pt on tocilimuzab and received on 8/16  On decadron started 8/15  D dimer high - doppler LE pending requested 8/16 repeat d dimer too  Strict droplet isolation  pronning q 2 hrs with 16 hrs if possible    Thrombocytopenia (HCC)  Assessment & Plan  Platelets 156 k awaiting repeat cbc  monitor     As Fio2 holding will transfer to telemetry    VTE:  Lovenox  Ulcer: Not Indicated  Lines: None    I have performed a physical exam and reviewed and updated ROS and Plan today (8/17/2021). In review of yesterday's note (8/16/2021), there are no changes except as documented above.     Discussed patient condition and risk of morbidity and/or mortality with Charge nurse / hot rounds

## 2021-08-18 LAB
ALBUMIN SERPL BCP-MCNC: 3.1 G/DL (ref 3.2–4.9)
ALBUMIN/GLOB SERPL: 1 G/DL
ALP SERPL-CCNC: 58 U/L (ref 30–99)
ALT SERPL-CCNC: 248 U/L (ref 2–50)
ANION GAP SERPL CALC-SCNC: 8 MMOL/L (ref 7–16)
AST SERPL-CCNC: 129 U/L (ref 12–45)
BASOPHILS # BLD AUTO: 0.1 % (ref 0–1.8)
BASOPHILS # BLD: 0.01 K/UL (ref 0–0.12)
BILIRUB SERPL-MCNC: 0.5 MG/DL (ref 0.1–1.5)
BUN SERPL-MCNC: 29 MG/DL (ref 8–22)
CALCIUM SERPL-MCNC: 8.3 MG/DL (ref 8.4–10.2)
CHLORIDE SERPL-SCNC: 100 MMOL/L (ref 96–112)
CO2 SERPL-SCNC: 27 MMOL/L (ref 20–33)
CREAT SERPL-MCNC: 0.84 MG/DL (ref 0.5–1.4)
EOSINOPHIL # BLD AUTO: 0.01 K/UL (ref 0–0.51)
EOSINOPHIL NFR BLD: 0.1 % (ref 0–6.9)
ERYTHROCYTE [DISTWIDTH] IN BLOOD BY AUTOMATED COUNT: 41.3 FL (ref 35.9–50)
GLOBULIN SER CALC-MCNC: 3.2 G/DL (ref 1.9–3.5)
GLUCOSE SERPL-MCNC: 116 MG/DL (ref 65–99)
HCT VFR BLD AUTO: 44.9 % (ref 42–52)
HGB BLD-MCNC: 15.2 G/DL (ref 14–18)
IMM GRANULOCYTES # BLD AUTO: 0.05 K/UL (ref 0–0.11)
IMM GRANULOCYTES NFR BLD AUTO: 0.7 % (ref 0–0.9)
LYMPHOCYTES # BLD AUTO: 0.73 K/UL (ref 1–4.8)
LYMPHOCYTES NFR BLD: 10.5 % (ref 22–41)
MCH RBC QN AUTO: 29.7 PG (ref 27–33)
MCHC RBC AUTO-ENTMCNC: 33.9 G/DL (ref 33.7–35.3)
MCV RBC AUTO: 87.7 FL (ref 81.4–97.8)
MONOCYTES # BLD AUTO: 0.25 K/UL (ref 0–0.85)
MONOCYTES NFR BLD AUTO: 3.6 % (ref 0–13.4)
NEUTROPHILS # BLD AUTO: 5.88 K/UL (ref 1.82–7.42)
NEUTROPHILS NFR BLD: 85 % (ref 44–72)
NRBC # BLD AUTO: 0 K/UL
NRBC BLD-RTO: 0 /100 WBC
PLATELET # BLD AUTO: 283 K/UL (ref 164–446)
PMV BLD AUTO: 9.8 FL (ref 9–12.9)
POTASSIUM SERPL-SCNC: 4.2 MMOL/L (ref 3.6–5.5)
PROT SERPL-MCNC: 6.3 G/DL (ref 6–8.2)
RBC # BLD AUTO: 5.12 M/UL (ref 4.7–6.1)
SODIUM SERPL-SCNC: 135 MMOL/L (ref 135–145)
WBC # BLD AUTO: 6.9 K/UL (ref 4.8–10.8)

## 2021-08-18 PROCEDURE — A9270 NON-COVERED ITEM OR SERVICE: HCPCS | Performed by: HOSPITALIST

## 2021-08-18 PROCEDURE — 770001 HCHG ROOM/CARE - MED/SURG/GYN PRIV*

## 2021-08-18 PROCEDURE — 85025 COMPLETE CBC W/AUTO DIFF WBC: CPT

## 2021-08-18 PROCEDURE — 99233 SBSQ HOSP IP/OBS HIGH 50: CPT | Performed by: STUDENT IN AN ORGANIZED HEALTH CARE EDUCATION/TRAINING PROGRAM

## 2021-08-18 PROCEDURE — 700102 HCHG RX REV CODE 250 W/ 637 OVERRIDE(OP): Performed by: HOSPITALIST

## 2021-08-18 PROCEDURE — 94640 AIRWAY INHALATION TREATMENT: CPT

## 2021-08-18 PROCEDURE — 80053 COMPREHEN METABOLIC PANEL: CPT

## 2021-08-18 PROCEDURE — 700111 HCHG RX REV CODE 636 W/ 250 OVERRIDE (IP): Performed by: HOSPITALIST

## 2021-08-18 PROCEDURE — 94760 N-INVAS EAR/PLS OXIMETRY 1: CPT

## 2021-08-18 RX ADMIN — ENOXAPARIN SODIUM 40 MG: 40 INJECTION SUBCUTANEOUS at 17:42

## 2021-08-18 RX ADMIN — DEXAMETHASONE 6 MG: 4 TABLET ORAL at 05:38

## 2021-08-18 ASSESSMENT — ENCOUNTER SYMPTOMS
ABDOMINAL PAIN: 0
NAUSEA: 0
MYALGIAS: 0
FEVER: 0
DIARRHEA: 0
HEADACHES: 1
COUGH: 1
CHILLS: 0
SHORTNESS OF BREATH: 1
EYES NEGATIVE: 1
VOMITING: 0
WEAKNESS: 1

## 2021-08-18 ASSESSMENT — PAIN DESCRIPTION - PAIN TYPE
TYPE: ACUTE PAIN

## 2021-08-18 NOTE — CARE PLAN
The patient is Watcher - Medium risk of patient condition declining or worsening    Shift Goals  Clinical Goals: Prone over night, Improve oxygenation  Patient Goals: Sleep, be able to catch breath    Progress made toward(s) clinical / shift goals:    Problem: Knowledge Deficit - Standard  Goal: Patient and family/care givers will demonstrate understanding of plan of care, disease process/condition, diagnostic tests and medications  Outcome: Progressing  Note: Pt encouraged to ask questions and call for any needs.      Problem: Mobility  Goal: Patient's capacity to carry out activities will improve  Outcome: Progressing  Flowsheets (Taken 8/18/2021 0112)  Mobility: Encouraged mobilization per interdisciplinary team recommendations  Assistance: No Assistance Required  Note: Pt able to mobilize in room with minimal assistance.       Patient is not progressing towards the following goals:      Problem: Respiratory  Goal: Patient will achieve/maintain optimum respiratory ventilation and gas exchange  8/18/2021 0344 by Elian Brandon R.N.  Outcome: Not Progressing  Flowsheets (Taken 8/18/2021 0344)  O2 Delivery Device: Heated High Flow Nasal Cannula  Deep Breathe and Cough: Performs Correctly  Note: Pt encouraged to prone over night to aide with Oxygenation, O2 sats improved immediately-sats in the mid 90's. When not prone pt's O2 sats decrease to the 80's or with any exertion.

## 2021-08-18 NOTE — PROGRESS NOTES
Hospital Medicine Daily Progress Note    Date of Service  8/18/2021    Chief Complaint  Venkat Mejía is a 56 y.o. male admitted 8/15/2021 with SOB    Hospital Course  A 56-year-old man with h/o recently diagnosed COVID [facility]    Interval Problem Update  8/18: Patient is transferred to telemetry from ICU. Patient reports cough. Afebrile, hemodynamically stable, tachypneic 24. On HFNC 55 L FiO2 65%.    I have personally seen and examined the patient at bedside. I discussed the plan of care with patient, bedside RN, charge RN,  and pharmacy.    Consultants/Specialty  critical care    Code Status  Full Code    Disposition  Patient is not medically cleared.   Anticipate discharge to to home with close outpatient follow-up.  I have placed the appropriate orders for post-discharge needs.    Review of Systems  Review of Systems   Constitutional: Positive for malaise/fatigue. Negative for chills and fever.   HENT: Negative.    Eyes: Negative.    Respiratory: Positive for cough and shortness of breath.    Cardiovascular: Negative for chest pain.   Gastrointestinal: Negative for abdominal pain, diarrhea, nausea and vomiting.   Genitourinary: Negative for dysuria.   Musculoskeletal: Negative for myalgias.   Skin: Negative for rash.   Neurological: Positive for weakness and headaches.        Physical Exam  Temp:  [36.4 °C (97.5 °F)-36.8 °C (98.2 °F)] 36.4 °C (97.6 °F)  Pulse:  [53-68] 60  Resp:  [16-24] 20  BP: (115-150)/(68-91) 123/70  SpO2:  [78 %-97 %] 93 %    Physical Exam  Vitals and nursing note reviewed.   Constitutional:       Appearance: He is ill-appearing.   HENT:      Head: Normocephalic.      Mouth/Throat:      Mouth: Mucous membranes are moist.      Pharynx: Oropharynx is clear.   Eyes:      Pupils: Pupils are equal, round, and reactive to light.   Cardiovascular:      Rate and Rhythm: Normal rate and regular rhythm.       Pulses: Normal pulses.   Pulmonary:      Effort: Pulmonary effort is normal.      Breath sounds: Rales present.   Abdominal:      General: Abdomen is flat. Bowel sounds are normal. There is no distension.      Tenderness: There is no abdominal tenderness.   Musculoskeletal:         General: Normal range of motion.      Cervical back: Normal range of motion.   Skin:     General: Skin is warm.   Neurological:      General: No focal deficit present.      Mental Status: He is alert and oriented to person, place, and time.         Fluids    Intake/Output Summary (Last 24 hours) at 8/18/2021 1306  Last data filed at 8/18/2021 1200  Gross per 24 hour   Intake 900 ml   Output 765 ml   Net 135 ml       Laboratory  Recent Labs     08/15/21  2200 08/17/21  0755 08/18/21  0438   WBC 6.7 7.7 6.9   RBC 4.97 5.33 5.12   HEMOGLOBIN 14.8 16.0 15.2   HEMATOCRIT 42.4 45.8 44.9   MCV 85.3 85.9 87.7   MCH 29.8 30.0 29.7   MCHC 34.9 34.9 33.9   RDW 41.0 41.2 41.3   PLATELETCT 156* 268 283   MPV 10.4 9.8 9.8     Recent Labs     08/15/21  2200 08/17/21  0755 08/18/21  0438   SODIUM 135 135 135   POTASSIUM 3.7 4.3 4.2   CHLORIDE 101 101 100   CO2 21 23 27   GLUCOSE 141* 139* 116*   BUN 21 29* 29*   CREATININE 0.76 0.64 0.84   CALCIUM 7.9* 8.4 8.3*                   Imaging  US-EXTREMITY VENOUS LOWER BILAT   Final Result      DX-CHEST-PORTABLE (1 VIEW)   Final Result      1.  Bilateral perihilar and lower lobe interstitial opacity which represent interstitial edema or pneumonia.      2.  No lobar consolidation.              Assessment/Plan  * Pneumonia due to COVID-19 virus- (present on admission)  Assessment & Plan  Patient 1 week ago, on 8/8/2021, started having fevers and chills.  He went to the local emergency room in [place].  There they diagnosed him with COVID-19 infection.  He was sent home on 8/9/2021.  Today he got worse with his respiratory status and thus went to the emergency room again.  They had to put him on  BiPAP due to the hypoxia and the transferred him to [facility].  Here he is admitted to the ICU due to the need of BiPAP.  Going to start him on Decadron, zinc, vitamin C, vitamin D.  Patient will be placed on prophylactic anticoagulation with Lovenox  Patient is unvaccinated.  Patient currently requires high amounts of oxygen.    Acute respiratory failure with hypoxia (HCC)- (present on admission)  Assessment & Plan  Secondary to bilateral COVID-19 infection  Continue at this point with BiPAP  Optimize oxygen use to keep oxygen saturations above 90%.       VTE prophylaxis: enoxaparin ppx    I have performed a physical exam and reviewed and updated ROS and Plan today (8/18/2021). In review of yesterday's note (8/17/2021), there are no changes except as documented above.

## 2021-08-18 NOTE — PROGRESS NOTES
Assumed pt care. Report received from Radha Raphael. Pt resting in bed, reports no pain or any needs at this time. POC reviewed and discussed, pt in agreement. Pt encouraged to deep breathe, prone and cough up any secretions. Pt also encouraged to call with any needs.     2220 Pt encouraged to prone overnight, pt in agreement. Pt tolerated well and oxygen sats immediately improved to mid 90's. Continuing to monitor.

## 2021-08-18 NOTE — PROGRESS NOTES
ISOLATION PRECAUTIONS EDUCATION    Educated PATIENT, FAMILY, S.O: patient on isolation for COVID-19.    Educated on reason for isolation, how the infection may be transmitted, and how to help prevent transmission to others. Educated precautions involves staff and visitors wearing PPE, following Standard Precautions and performing meticulous hand hygiene in order to prevent transmission of infection.     Enhanced Droplet Precautions: Educated that Enhanced Droplet Precautions involves staff and visitors wearing a surgical mask when in the patient room.     In addition,  educated that they may leave their room, but prior to exiting the patient room each time, the patient needs to have on a fresh patient gown, a surgical mask must be worn by the patient while out of the patient room, and perform hand hygiene immediately prior to exiting the room.       Patient transport and mobilization on unit  Educated that they may leave their room, but prior to exiting, the patient needs to have on a fresh patient gown, ensure the potentially infectious area is covered, performing appropriate hand hygiene immediately prior to exiting the room.

## 2021-08-18 NOTE — DISCHARGE PLANNING
Anticipated Discharge Disposition:   Home when medically cleared with DME oxygen     Action:   Chart review complete.     Discussed patient's plan of care and plans for discharge during rounds. Per MD, patient is an ICU stepdown and is now on telemetry. Patient remains on HFNC.     No discharge needs at this time. RN CM will continue to follow.     Barriers to Discharge:   Medical Clearance  Possible DME need    Plan:   Hospital care management will continue to assist with discharge planning needs.

## 2021-08-18 NOTE — CARE PLAN
The patient is Watcher - Medium risk of patient condition declining or worsening    Shift Goals: Decrease 02 requirement,   Clinical Goals: Prone Positioning, comfort  Patient Goals: Sleep, feel better    Progress made toward(s) clinical / shift goals:  Progressing slowly,on HHF 55L/70%    Patient is not progressing towards the following goals:Unable to titrate 02 down      Problem: Respiratory  Goal: Patient will achieve/maintain optimum respiratory ventilation and gas exchange  Outcome: Not Progressing  Flowsheets (Taken 8/18/2021 1225)  O2 Delivery Device: Heated High Flow Nasal Cannula  Note: Pt assisted to prone position, as 02 saturation decreased to 80%. Pt cooperative and VU reason for prone positioning to increase 02 sat and increase lung volumes.

## 2021-08-19 PROCEDURE — A9270 NON-COVERED ITEM OR SERVICE: HCPCS | Performed by: HOSPITALIST

## 2021-08-19 PROCEDURE — 94760 N-INVAS EAR/PLS OXIMETRY 1: CPT

## 2021-08-19 PROCEDURE — 700102 HCHG RX REV CODE 250 W/ 637 OVERRIDE(OP): Performed by: HOSPITALIST

## 2021-08-19 PROCEDURE — 99233 SBSQ HOSP IP/OBS HIGH 50: CPT | Performed by: STUDENT IN AN ORGANIZED HEALTH CARE EDUCATION/TRAINING PROGRAM

## 2021-08-19 PROCEDURE — 770001 HCHG ROOM/CARE - MED/SURG/GYN PRIV*

## 2021-08-19 PROCEDURE — 700111 HCHG RX REV CODE 636 W/ 250 OVERRIDE (IP): Performed by: HOSPITALIST

## 2021-08-19 PROCEDURE — 94640 AIRWAY INHALATION TREATMENT: CPT

## 2021-08-19 RX ADMIN — DEXAMETHASONE 6 MG: 4 TABLET ORAL at 06:10

## 2021-08-19 RX ADMIN — ENOXAPARIN SODIUM 40 MG: 40 INJECTION SUBCUTANEOUS at 17:20

## 2021-08-19 ASSESSMENT — PAIN DESCRIPTION - PAIN TYPE
TYPE: ACUTE PAIN

## 2021-08-19 NOTE — PROGRESS NOTES
Pt aox4, moves x4 extremities follows commands, denies pain. Pt able to turn over in bed independently. Discussed importance of proning, pt demonstrates understanding. Tolerating HFNC, resps 22-26, mild work of breathing. POC reviewed,  used for assessment and communication. Bed locked and in low, call light within reach.

## 2021-08-19 NOTE — PROGRESS NOTES
Hourly rounding in place, pt calm, self proning, needs met at this time. Call light within reach, pt instructed to call for needs.

## 2021-08-19 NOTE — DISCHARGE PLANNING
Anticipated Discharge Disposition:   TBD, possibly SNF or home with DME oxygen when appropriate    Action:   Chart review complete.     Discussed patient's plan of care and plans for discharge during rounds. Per MD, this patient remains on HFNC. MD placed LTAC referral. RN CM informed MD that due to patient having no insurance, LTAC may not be an option due to cost of care. Leader, Ijeoma Billings, aware.     RN CM will continue to follow and assist as needed.     Barriers to Discharge:   Medical Clearance    Plan:   Hospital care management will continue to assist with discharge planning needs.

## 2021-08-19 NOTE — PROGRESS NOTES
Telemetry Shift Summary    Rhythm SB  HR Range 51 - 56  Ectopy rPVCs  Measurements 0.16/0.10/0.40        Normal Values  Rhythm SR  HR Range    Measurements 0.12-0.20 / 0.06-0.10  / 0.30-0.52

## 2021-08-19 NOTE — PROGRESS NOTES
Report received from Lucia BLACKWELL. Plan of care discussed. Patient resting comfortably in bed. Safety precautions in place.

## 2021-08-19 NOTE — FLOWSHEET NOTE
08/19/21 1100   Vital Signs   Pulse 66   Respiration (!) 24   Pulse Oximetry 94 %   $ Pulse Oximetry (Spot Check) Yes   Oxygen   O2 (LPM) 55   FiO2% 75 %   O2 Delivery Device Heated High Flow Nasal Cannula   Aerosols   $ Aerosol Delivery Device Heated High Flow Nasal Cannula   Aerosol Temperature 30 °C (86 °F)

## 2021-08-19 NOTE — PROGRESS NOTES
Okay to give updates to Venkat pelletier, per pt. Updated Venkat regarding pts status, discussed need for supplemental oxygen d/t COVID. Questions addressed.

## 2021-08-19 NOTE — PROGRESS NOTES
Received bedside patient report from ROSALVA Nagel. Patient resting comfortably in bed, no complaints at this time. Safety precautions in place. Will continue to monitor.

## 2021-08-19 NOTE — CARE PLAN
Problem: Knowledge Deficit - Standard  Goal: Patient and family/care givers will demonstrate understanding of plan of care, disease process/condition, diagnostic tests and medications  Outcome: Progressing     Problem: Psychosocial  Goal: Patient's level of anxiety will decrease  Outcome: Progressing     Problem: Communication  Goal: The ability to communicate needs accurately and effectively will improve  Outcome: Progressing     Problem: Respiratory  Goal: Patient will achieve/maintain optimum respiratory ventilation and gas exchange  Outcome: Progressing   The patient is Watcher - Medium risk of patient condition declining or worsening    Shift Goals  Clinical Goals: reduce 02 needs  Patient Goals: rest    Progress made toward(s) clinical / shift goals:   Reduce fio2 needs throughout shift    Patient is not progressing towards the following goals:

## 2021-08-19 NOTE — CARE PLAN
The patient is Stable - Low risk of patient condition declining or worsening    Shift Goals  Clinical Goals: Prone, Wean off oxygen  Patient Goals: Rest and sleep    Progress made toward(s) clinical / shift goals:    Problem: Knowledge Deficit - Standard  Goal: Patient and family/care givers will demonstrate understanding of plan of care, disease process/condition, diagnostic tests and medications  Outcome: Progressing  Note: Patient updated on the plan of care. Encouraged to voice feelings and to ask questions. Answered all questions and concerns. Agrees with the plan of care.

## 2021-08-19 NOTE — PROGRESS NOTES
Hospital Medicine Daily Progress Note    Date of Service  8/19/2021    Chief Complaint  Venkat Mejía is a 56 y.o. male admitted 8/15/2021 with SOB    Hospital Course  A 56-year-old man with h[facility]o recently [facility]    Interval Problem Update  8/18: Patient is transferred to telemetry from ICU. Patient reports cough. Afebrile, hemodynamically stable, tachypneic 24. On HFNC 55 L FiO2 65%.    8/19: Patient states that he feels better, on prone position. Reports SOB, cough. Afebrile, hemodynamically stable. On HFNC 55 L, FiO2 75%.  Referral to LTAC placed.    I have personally seen and examined the patient at bedside. I discussed the plan of care with patient, bedside RN, charge RN,  and pharmacy.    Consultants/Specialty  critical care    Code Status  Full Code    Disposition  Patient is not medically cleared.   Anticipate discharge to to home with organized home healthcare and close outpatient follow-up.  I have placed the appropriate orders for post-discharge needs.    Review of Systems  Review of Systems   Constitutional: Positive for malaise/fatigue. Negative for chills and fever.   HENT: Negative.    Eyes: Negative.    Respiratory: Positive for cough and shortness of breath.    Cardiovascular: Negative for chest pain.   Gastrointestinal: Negative for abdominal pain, diarrhea, nausea and vomiting.   Genitourinary: Negative for dysuria.   Musculoskeletal: Negative for myalgias.   Skin: Negative for rash.   Neurological: Positive for weakness and headaches.      Physical Exam  Temp:  [36.4 °C (97.6 °F)-37 °C (98.6 °F)] 36.4 °C (97.6 °F)  Pulse:  [50-75] 67  Resp:  [18-23] 23  BP: (118-142)/(73-88) 129/77  SpO2:  [77 %-98 %] 88 %    Physical Exam  Vitals and nursing note reviewed.   Constitutional:       Appearance: He is ill-appearing.   HENT:      Head: Normocephalic.      Mouth/Throat:      Mouth: Mucous  membranes are moist.      Pharynx: Oropharynx is clear.   Eyes:      Pupils: Pupils are equal, round, and reactive to light.   Cardiovascular:      Rate and Rhythm: Normal rate and regular rhythm.      Pulses: Normal pulses.   Pulmonary:      Effort: Pulmonary effort is normal.      Breath sounds: Rales present.   Abdominal:      General: Abdomen is flat. Bowel sounds are normal. There is no distension.      Tenderness: There is no abdominal tenderness.   Musculoskeletal:         General: Normal range of motion.      Cervical back: Normal range of motion.   Skin:     General: Skin is warm.   Neurological:      General: No focal deficit present.      Mental Status: He is alert and oriented to person, place, and time.     Fluids    Intake/Output Summary (Last 24 hours) at 8/19/2021 1025  Last data filed at 8/19/2021 0800  Gross per 24 hour   Intake 1120 ml   Output 985 ml   Net 135 ml       Laboratory  Recent Labs     08/17/21  0755 08/18/21  0438   WBC 7.7 6.9   RBC 5.33 5.12   HEMOGLOBIN 16.0 15.2   HEMATOCRIT 45.8 44.9   MCV 85.9 87.7   MCH 30.0 29.7   MCHC 34.9 33.9   RDW 41.2 41.3   PLATELETCT 268 283   MPV 9.8 9.8     Recent Labs     08/17/21  0755 08/18/21  0438   SODIUM 135 135   POTASSIUM 4.3 4.2   CHLORIDE 101 100   CO2 23 27   GLUCOSE 139* 116*   BUN 29* 29*   CREATININE 0.64 0.84   CALCIUM 8.4 8.3*                   Imaging  US-EXTREMITY VENOUS LOWER BILAT   Final Result      DX-CHEST-PORTABLE (1 VIEW)   Final Result      1.  Bilateral perihilar and lower lobe interstitial opacity which represent interstitial edema or pneumonia.      2.  No lobar consolidation.              Assessment/Plan  * Pneumonia due to COVID-19 virus- (present on admission)  Assessment & Plan  Patient 1 week ago, on 8/8/2021, started having fevers and chills.  He went to the local emergency room in [place].  There they diagnosed him with COVID-19 infection.  He was sent home on 8/9/2021.  Today he got worse with his  respiratory status and thus went to the emergency room again.  They had to put him on BiPAP due to the hypoxia and the transferred him to [facility].  Here he is admitted to the ICU due to the need of BiPAP.  Going to start him on Decadron, zinc, vitamin C, vitamin D.  Patient will be placed on prophylactic anticoagulation with Lovenox  Patient is unvaccinated.  Patient currently requires high amounts of oxygen.    Acute respiratory failure with hypoxia (HCC)- (present on admission)  Assessment & Plan  Secondary to bilateral COVID-19 infection  Continue at this point with BiPAP  Optimize oxygen use to keep oxygen saturations above 90%.       VTE prophylaxis: enoxaparin ppx    I have performed a physical exam and reviewed and updated ROS and Plan today (8/19/2021). In review of yesterday's note (8/18/2021), there are no changes except as documented above.

## 2021-08-20 LAB
ALBUMIN SERPL BCP-MCNC: 2.8 G/DL (ref 3.2–4.9)
ALBUMIN/GLOB SERPL: 0.8 G/DL
ALP SERPL-CCNC: 61 U/L (ref 30–99)
ALT SERPL-CCNC: 169 U/L (ref 2–50)
ANION GAP SERPL CALC-SCNC: 8 MMOL/L (ref 7–16)
AST SERPL-CCNC: 87 U/L (ref 12–45)
BASOPHILS # BLD AUTO: 0.7 % (ref 0–1.8)
BASOPHILS # BLD: 0.05 K/UL (ref 0–0.12)
BILIRUB SERPL-MCNC: 0.4 MG/DL (ref 0.1–1.5)
BUN SERPL-MCNC: 22 MG/DL (ref 8–22)
CALCIUM SERPL-MCNC: 8.3 MG/DL (ref 8.4–10.2)
CHLORIDE SERPL-SCNC: 99 MMOL/L (ref 96–112)
CO2 SERPL-SCNC: 26 MMOL/L (ref 20–33)
CREAT SERPL-MCNC: 0.77 MG/DL (ref 0.5–1.4)
EOSINOPHIL # BLD AUTO: 0.2 K/UL (ref 0–0.51)
EOSINOPHIL NFR BLD: 2.8 % (ref 0–6.9)
ERYTHROCYTE [DISTWIDTH] IN BLOOD BY AUTOMATED COUNT: 39.4 FL (ref 35.9–50)
GLOBULIN SER CALC-MCNC: 3.4 G/DL (ref 1.9–3.5)
GLUCOSE SERPL-MCNC: 110 MG/DL (ref 65–99)
HCT VFR BLD AUTO: 47.1 % (ref 42–52)
HGB BLD-MCNC: 16.2 G/DL (ref 14–18)
IMM GRANULOCYTES # BLD AUTO: 0.24 K/UL (ref 0–0.11)
IMM GRANULOCYTES NFR BLD AUTO: 3.4 % (ref 0–0.9)
LYMPHOCYTES # BLD AUTO: 0.7 K/UL (ref 1–4.8)
LYMPHOCYTES NFR BLD: 10 % (ref 22–41)
MCH RBC QN AUTO: 29.6 PG (ref 27–33)
MCHC RBC AUTO-ENTMCNC: 34.4 G/DL (ref 33.7–35.3)
MCV RBC AUTO: 85.9 FL (ref 81.4–97.8)
MONOCYTES # BLD AUTO: 0.19 K/UL (ref 0–0.85)
MONOCYTES NFR BLD AUTO: 2.7 % (ref 0–13.4)
NEUTROPHILS # BLD AUTO: 5.65 K/UL (ref 1.82–7.42)
NEUTROPHILS NFR BLD: 80.4 % (ref 44–72)
NRBC # BLD AUTO: 0 K/UL
NRBC BLD-RTO: 0 /100 WBC
PLATELET # BLD AUTO: 384 K/UL (ref 164–446)
PMV BLD AUTO: 9 FL (ref 9–12.9)
POTASSIUM SERPL-SCNC: 4.8 MMOL/L (ref 3.6–5.5)
PROT SERPL-MCNC: 6.2 G/DL (ref 6–8.2)
RBC # BLD AUTO: 5.48 M/UL (ref 4.7–6.1)
SODIUM SERPL-SCNC: 133 MMOL/L (ref 135–145)
WBC # BLD AUTO: 7 K/UL (ref 4.8–10.8)

## 2021-08-20 PROCEDURE — 700102 HCHG RX REV CODE 250 W/ 637 OVERRIDE(OP): Performed by: HOSPITALIST

## 2021-08-20 PROCEDURE — 99233 SBSQ HOSP IP/OBS HIGH 50: CPT | Performed by: STUDENT IN AN ORGANIZED HEALTH CARE EDUCATION/TRAINING PROGRAM

## 2021-08-20 PROCEDURE — 700111 HCHG RX REV CODE 636 W/ 250 OVERRIDE (IP): Performed by: HOSPITALIST

## 2021-08-20 PROCEDURE — 80053 COMPREHEN METABOLIC PANEL: CPT

## 2021-08-20 PROCEDURE — 94640 AIRWAY INHALATION TREATMENT: CPT

## 2021-08-20 PROCEDURE — 770006 HCHG ROOM/CARE - MED/SURG/GYN SEMI*

## 2021-08-20 PROCEDURE — 94760 N-INVAS EAR/PLS OXIMETRY 1: CPT

## 2021-08-20 PROCEDURE — A9270 NON-COVERED ITEM OR SERVICE: HCPCS | Performed by: HOSPITALIST

## 2021-08-20 PROCEDURE — 85025 COMPLETE CBC W/AUTO DIFF WBC: CPT

## 2021-08-20 RX ORDER — CALCIUM CARBONATE 500 MG/1
500 TABLET, CHEWABLE ORAL 3 TIMES DAILY PRN
Status: DISCONTINUED | OUTPATIENT
Start: 2021-08-20 | End: 2021-08-31 | Stop reason: HOSPADM

## 2021-08-20 RX ADMIN — ENOXAPARIN SODIUM 40 MG: 40 INJECTION SUBCUTANEOUS at 17:29

## 2021-08-20 RX ADMIN — CALCIUM CARBONATE (ANTACID) CHEW TAB 500 MG 500 MG: 500 CHEW TAB at 21:23

## 2021-08-20 RX ADMIN — DEXAMETHASONE 6 MG: 4 TABLET ORAL at 06:15

## 2021-08-20 ASSESSMENT — PATIENT HEALTH QUESTIONNAIRE - PHQ9
1. LITTLE INTEREST OR PLEASURE IN DOING THINGS: NOT AT ALL
2. FEELING DOWN, DEPRESSED, IRRITABLE, OR HOPELESS: NOT AT ALL
SUM OF ALL RESPONSES TO PHQ9 QUESTIONS 1 AND 2: 0

## 2021-08-20 ASSESSMENT — PAIN DESCRIPTION - PAIN TYPE
TYPE: ACUTE PAIN

## 2021-08-20 ASSESSMENT — COGNITIVE AND FUNCTIONAL STATUS - GENERAL
MOBILITY SCORE: 24
SUGGESTED CMS G CODE MODIFIER DAILY ACTIVITY: CH
DAILY ACTIVITIY SCORE: 24
SUGGESTED CMS G CODE MODIFIER MOBILITY: CH

## 2021-08-20 NOTE — PROGRESS NOTES
Report received from Bernice; pt on enhanced droplet precautions.    Morning assessment done about 0810 after he ate breakfast; pt was proning.  Pt maxed on HHF sating 93%.  Encouraged deep breathing exercises.    As the day has progressed pt has been proning most of the day except to exercise about 1400 moving his legs and standing.      Starting to turn down HHF, trialing 60/80 now.    Tele strip at 1337 shows SR at 68.      Measurements from am strip were as follows:  AR=0.16  QRS=0.10  QT=0.40    Tele Shift Summary:    Rhythm : SR  Rate : 64-85  Ectopy : Per CCT Anastasia, pt had rare PVCs.     Telemetry monitoring strips placed in pt's chart.

## 2021-08-20 NOTE — PROGRESS NOTES
Report from ROSALVA Ji.    Pt is on hi-cherise O2, 55LPM and 75%. Pt is A&Ox4, SBA, and is self proning often.     Awaiting pt arrival to floor from ICU.

## 2021-08-20 NOTE — PROGRESS NOTES
Pt arrived on floor with RN and RT.     Pt immediately climbed into bed to self-prone.   RT changed hi-cherise stats to 60LPM/100% for now, pt was desatting a bit to 80%s.  on, pt currently at 93%.     Pt proning and resting in bed, assessment completed, 4 eyes skin check with ROSALVA Lilly.     Bed alarm and universal fall and safety precautions in place. Pt is SBA for getting up, and call light is within reach. No current needs, pt wants to rest.

## 2021-08-20 NOTE — RESPIRATORY CARE
REMOTE MONITORING PROGRAM by RESPIRATORY THERAPY  8/20/2021 at 10:55 AM by Sara Armenta, RRT     Patient chart reviewed for COVID remote monitoring eligibility. Patient does not qualify for RPM due to insurance

## 2021-08-20 NOTE — PROGRESS NOTES
Telemetry Shift Summary    Rhythm SR  HR Range 61-65  Ectopy R-PVC  Measurements 0.16/0.08/0.40        Normal Values  Rhythm SR  HR Range    Measurements 0.12-0.20 / 0.06-0.10  / 0.30-0.52

## 2021-08-20 NOTE — CARE PLAN
Problem: Knowledge Deficit - Standard  Goal: Patient and family/care givers will demonstrate understanding of plan of care, disease process/condition, diagnostic tests and medications  Outcome: Progressing     Problem: Discharge Barriers/Planning  Goal: Patient's continuum of care needs are met  Outcome: Progressing     Problem: Respiratory  Goal: Patient will achieve/maintain optimum respiratory ventilation and gas exchange  Outcome: Progressing   The patient is Watcher - Medium risk of patient condition declining or worsening    Shift Goals  Clinical Goals: Decrease oxygen needs, prone  Patient Goals: exercise    Progress made toward(s) clinical / shift goals:  working on achieving; just turning down HHF    Patient is not progressing towards the following goals:

## 2021-08-20 NOTE — FLOWSHEET NOTE
08/20/21 1000   Events/Summary/Plan   Skin Inspection Respiratory Device Intact   Vital Signs   Pulse 95   Respiration (!) 24   Pulse Oximetry 92 %   $ Pulse Oximetry (Spot Check) Yes   Oxygen   O2 (LPM) 60   FiO2% 100 %   O2 Delivery Device Heated High Flow Nasal Cannula   Aerosols   $ Aerosol Delivery Device Heated High Flow Nasal Cannula   Aerosol Temperature 30 °C (86 °F)

## 2021-08-20 NOTE — CARE PLAN
The patient is Stable - Low risk of patient condition declining or worsening    Shift Goals  Clinical Goals: decrease O2 needs  Patient Goals: prone throughout night    Progress made toward(s) clinical / shift goals:  Patient O2 sats remain 90 or greater     Problem: Respiratory  Goal: Patient will achieve/maintain optimum respiratory ventilation and gas exchange  Outcome: Progressing  Settings on high flow remain unchanged. Sats to remain >90%    Problem: Mobility  Goal: Patient's capacity to carry out activities will improve  Outcome: Progressing  Patient continues to self prone. Goal for patient to prone most of 12 hour shift

## 2021-08-20 NOTE — PROGRESS NOTES
Hospital Medicine Daily Progress Note    Date of Service  8/20/2021    Chief Complaint  Venkat Mejía is a 56 y.o. male admitted 8/15/2021 with SOB    Hospital Course  A 56-year-old man with h/o recently diagnosed [facility]     8/18: Patient is transferred to telemetry from ICU. Patient reports cough. Afebrile, hemodynamically stable, tachypneic 24. On HFNC 55 L FiO2 65%.     8/19: Patient states that he feels better, on prone position. Reports SOB, cough. Afebrile, hemodynamically stable. On HFNC 55 L, FiO2 75%. Referral to LTAC placed.    Interval Problem Update  8/20: Tolerating prone position. Afebrile, hemodynamically stable. On HFNC 60L, FiO2 100%. Tachypenic. Na 133, monitor. LFTs improving. Patient does not have insurance, LTAC is not option.    I have personally seen and examined the patient at bedside. I discussed the plan of care with patient, bedside RN, charge RN,  and pharmacy.    Consultants/Specialty  critical care    Code Status  Full Code    Disposition  Patient is not medically cleared.   Anticipate discharge to to home with close outpatient follow-up.  I have placed the appropriate orders for post-discharge needs.    Review of Systems  Review of Systems   Constitutional: Positive for malaise/fatigue. Negative for chills and fever.   HENT: Negative.    Eyes: Negative.    Respiratory: Positive for cough and shortness of breath.    Cardiovascular: Negative for chest pain.   Gastrointestinal: Negative for abdominal pain, diarrhea, nausea and vomiting.   Genitourinary: Negative for dysuria.   Musculoskeletal: Negative for myalgias.   Skin: Negative for rash.   Neurological: Positive for weakness and headaches.     Physical Exam  Temp:  [36.6 °C (97.8 °F)-37.1 °C (98.8 °F)] 37 °C (98.6 °F)  Pulse:  [54-95] 67  Resp:  [20-24] 20  BP: (110-153)/() 115/66  SpO2:  [86 %-94 %] 92 %    Physical  Exam  Vitals and nursing note reviewed.   Constitutional:       Appearance: He is ill-appearing.   HENT:      Head: Normocephalic.      Mouth/Throat:      Mouth: Mucous membranes are moist.      Pharynx: Oropharynx is clear.   Eyes:      Pupils: Pupils are equal, round, and reactive to light.   Cardiovascular:      Rate and Rhythm: Normal rate and regular rhythm.      Pulses: Normal pulses.   Pulmonary:      Effort: Pulmonary effort is normal.      Breath sounds: Rales present.   Abdominal:      General: Abdomen is flat. Bowel sounds are normal. There is no distension.      Tenderness: There is no abdominal tenderness.   Musculoskeletal:         General: Normal range of motion.      Cervical back: Normal range of motion.   Skin:     General: Skin is warm.   Neurological:      General: No focal deficit present.      Mental Status: He is alert and oriented to person, place, and time.     Fluids    Intake/Output Summary (Last 24 hours) at 8/20/2021 1151  Last data filed at 8/20/2021 1000  Gross per 24 hour   Intake 780 ml   Output 1340 ml   Net -560 ml       Laboratory  Recent Labs     08/18/21  0438 08/20/21  0328   WBC 6.9 7.0   RBC 5.12 5.48   HEMOGLOBIN 15.2 16.2   HEMATOCRIT 44.9 47.1   MCV 87.7 85.9   MCH 29.7 29.6   MCHC 33.9 34.4   RDW 41.3 39.4   PLATELETCT 283 384   MPV 9.8 9.0     Recent Labs     08/18/21  0438 08/20/21  0328   SODIUM 135 133*   POTASSIUM 4.2 4.8   CHLORIDE 100 99   CO2 27 26   GLUCOSE 116* 110*   BUN 29* 22   CREATININE 0.84 0.77   CALCIUM 8.3* 8.3*                   Imaging  US-EXTREMITY VENOUS LOWER BILAT   Final Result      DX-CHEST-PORTABLE (1 VIEW)   Final Result      1.  Bilateral perihilar and lower lobe interstitial opacity which represent interstitial edema or pneumonia.      2.  No lobar consolidation.              Assessment/Plan  * Pneumonia due to COVID-19 virus- (present on admission)  Assessment & Plan  Patient 1 week ago, on 8/8/2021, started having fevers and chills.  He  went to the local emergency room in [place].  There they diagnosed him with COVID-19 infection.  He was sent home on 8/9/2021.  On day of admission he got worse with his respiratory status and thus went to the emergency room again.  They had to put him on BiPAP due to the hypoxia and the transferred him to [facility].  Here he was admitted to the ICU due to the need of BiPAP.  On Decadron  On prophylactic anticoagulation with Lovenox  Patient is unvaccinated.  Patient currently requires high amounts of oxygen.    Acute respiratory failure with hypoxia (HCC)- (present on admission)  Assessment & Plan  Secondary to bilateral COVID-19 infection  Continue HFNC, wean off as tolerated  Optimize oxygen use to keep oxygen saturations above 90%.       VTE prophylaxis: enoxaparin ppx    I have performed a physical exam and reviewed and updated ROS and Plan today (8/20/2021). In review of yesterday's note (8/19/2021), there are no changes except as documented above.

## 2021-08-20 NOTE — PROGRESS NOTES
4 Eyes Skin Assessment Completed by Bernice RN and Vijaya RN.    Head/face Blanching and Redness- on cheeks, from hi-cherise O2.   Ears WDL  Nose WDL  Mouth WDL  Neck WDL  Breast/Chest WDL  Shoulder Blades WDL  Spine WDL  (R) Arm/Elbow/Hand WDL  (L) Arm/Elbow/Hand WDL  Abdomen WDL  Groin WDL  Scrotum/Coccyx/Buttocks WDL  (R) Leg WDL  (L) Leg WDL  (R) Heel/Foot/Toe WDL  (L) Heel/Foot/Toe WDL          Devices In Places Tele Box, SCD's and HFNC      Interventions In Place Gray Ear Foams and Pressure Redistribution Mattress    Possible Skin Injury No    Pictures Uploaded Into Epic N/A  Wound Consult Placed N/A  RN Wound Prevention Protocol Ordered No

## 2021-08-20 NOTE — PROGRESS NOTES
1900-Report from ROSALVA Mckeon. POC reviewed. Patient resting in bed. No needs at this time. Continues to self prone.

## 2021-08-20 NOTE — CARE PLAN
Problem: Knowledge Deficit - Standard  Goal: Patient and family/care givers will demonstrate understanding of plan of care, disease process/condition, diagnostic tests and medications  Outcome: Progressing     Problem: Psychosocial  Goal: Patient's level of anxiety will decrease  Outcome: Progressing     Problem: Communication  Goal: The ability to communicate needs accurately and effectively will improve  Outcome: Progressing     Problem: Hemodynamics  Goal: Patient's hemodynamics, fluid balance and neurologic status will be stable or improve  Outcome: Progressing     Problem: Respiratory  Goal: Patient will achieve/maintain optimum respiratory ventilation and gas exchange  Outcome: Progressing     Problem: Fluid Volume  Goal: Fluid volume balance will be maintained  Outcome: Progressing   The patient is Watcher - Medium risk of patient condition declining or worsening    Shift Goals  Clinical Goals: decr O2 needs, wean off hi-cherise, prone  Patient Goals: prone at night, decr O2 needs, feel better    Progress made toward(s) clinical / shift goals:  proned most of night, feeling better    Patient is not progressing towards the following goals: stil on HHFNC at 60/100%

## 2021-08-21 LAB
ANION GAP SERPL CALC-SCNC: 10 MMOL/L (ref 7–16)
BUN SERPL-MCNC: 22 MG/DL (ref 8–22)
CALCIUM SERPL-MCNC: 8.6 MG/DL (ref 8.4–10.2)
CHLORIDE SERPL-SCNC: 100 MMOL/L (ref 96–112)
CO2 SERPL-SCNC: 23 MMOL/L (ref 20–33)
CREAT SERPL-MCNC: 0.71 MG/DL (ref 0.5–1.4)
GLUCOSE SERPL-MCNC: 104 MG/DL (ref 65–99)
POTASSIUM SERPL-SCNC: 4.4 MMOL/L (ref 3.6–5.5)
SODIUM SERPL-SCNC: 133 MMOL/L (ref 135–145)

## 2021-08-21 PROCEDURE — 700102 HCHG RX REV CODE 250 W/ 637 OVERRIDE(OP): Performed by: HOSPITALIST

## 2021-08-21 PROCEDURE — 99233 SBSQ HOSP IP/OBS HIGH 50: CPT | Performed by: STUDENT IN AN ORGANIZED HEALTH CARE EDUCATION/TRAINING PROGRAM

## 2021-08-21 PROCEDURE — A9270 NON-COVERED ITEM OR SERVICE: HCPCS | Performed by: INTERNAL MEDICINE

## 2021-08-21 PROCEDURE — 770006 HCHG ROOM/CARE - MED/SURG/GYN SEMI*

## 2021-08-21 PROCEDURE — 94760 N-INVAS EAR/PLS OXIMETRY 1: CPT

## 2021-08-21 PROCEDURE — 700111 HCHG RX REV CODE 636 W/ 250 OVERRIDE (IP): Performed by: HOSPITALIST

## 2021-08-21 PROCEDURE — 80048 BASIC METABOLIC PNL TOTAL CA: CPT

## 2021-08-21 PROCEDURE — 94640 AIRWAY INHALATION TREATMENT: CPT

## 2021-08-21 PROCEDURE — 700102 HCHG RX REV CODE 250 W/ 637 OVERRIDE(OP): Performed by: INTERNAL MEDICINE

## 2021-08-21 PROCEDURE — A9270 NON-COVERED ITEM OR SERVICE: HCPCS | Performed by: STUDENT IN AN ORGANIZED HEALTH CARE EDUCATION/TRAINING PROGRAM

## 2021-08-21 PROCEDURE — A9270 NON-COVERED ITEM OR SERVICE: HCPCS | Performed by: HOSPITALIST

## 2021-08-21 PROCEDURE — 700102 HCHG RX REV CODE 250 W/ 637 OVERRIDE(OP): Performed by: STUDENT IN AN ORGANIZED HEALTH CARE EDUCATION/TRAINING PROGRAM

## 2021-08-21 RX ORDER — ECHINACEA PURPUREA EXTRACT 125 MG
2 TABLET ORAL
Status: DISCONTINUED | OUTPATIENT
Start: 2021-08-21 | End: 2021-08-31 | Stop reason: HOSPADM

## 2021-08-21 RX ADMIN — DEXAMETHASONE 6 MG: 4 TABLET ORAL at 05:57

## 2021-08-21 RX ADMIN — BENZONATATE 100 MG: 100 CAPSULE ORAL at 03:34

## 2021-08-21 RX ADMIN — SALINE NASAL SPRAY 2 SPRAY: 1.5 SOLUTION NASAL at 20:54

## 2021-08-21 RX ADMIN — SALINE NASAL SPRAY 2 SPRAY: 1.5 SOLUTION NASAL at 13:42

## 2021-08-21 RX ADMIN — ENOXAPARIN SODIUM 40 MG: 40 INJECTION SUBCUTANEOUS at 17:03

## 2021-08-21 ASSESSMENT — FIBROSIS 4 INDEX: FIB4 SCORE: 1.01

## 2021-08-21 NOTE — CARE PLAN
The patient is Stable - Low risk of patient condition declining or worsening    Shift Goals  Clinical Goals: Decrease o2 demand, proning, IS usage  Patient Goals: rest, breathe easier    Progress made toward(s) clinical / shift goals:  O2 demand remained stable throughout the day. Pt encouraged to eat meals sitting at bedside, march in place, prone, and use IS as much as possible.     Patient is not progressing towards the following goals: n/a

## 2021-08-21 NOTE — PROGRESS NOTES
Telemetry Shift Summary    Rhythm SR  HR Range 60s  Ectopy rare PVCs  Measurements 0.20/0.10/0.40        Normal Values  Rhythm SR  HR Range    Measurements 0.12-0.20 / 0.06-0.10  / 0.30-0.52

## 2021-08-21 NOTE — PROGRESS NOTES
Pt's spO2 dropped to low 80s. FiO2 increased to 100 and 15L O2 via non-rebreather applied.  O2 saturation recovered to 90.

## 2021-08-21 NOTE — PROGRESS NOTES
Report received from ROSALVA Schuster. Plan of care discussed. Patient resting comfortably in bed, declines any further needs at this time. Safety precautions in place.

## 2021-08-21 NOTE — CARE PLAN
Problem: Psychosocial  Goal: Patient's level of anxiety will decrease  Outcome: Progressing     Problem: Respiratory  Goal: Patient will achieve/maintain optimum respiratory ventilation and gas exchange  Outcome: Not Progressing   The patient is Watcher - Medium risk of patient condition declining or worsening    Shift Goals  Clinical Goals: Decrease oxygen needs, prone  Patient Goals: exercise    Progress made toward(s) clinical / shift goals:  O2 at start of shift dropped to low 80s, fiO2 increased to 100% and non-rebreather applied with recovery to 90%.    Patient is not progressing towards the following goals:      Problem: Respiratory  Goal: Patient will achieve/maintain optimum respiratory ventilation and gas exchange  Outcome: Not Progressing

## 2021-08-21 NOTE — PROGRESS NOTES
Hospital Medicine Daily Progress Note    Date of Service  8/21/2021    Chief Complaint  Venkat Mejía is a 56 y.o. male admitted 8/15/2021 with SOB    Hospital Course  A 56-year-old man with h/o recently [facility]     8/18: Patient is transferred to telemetry from ICU. Patient reports cough. Afebrile, hemodynamically stable, tachypneic 24. On HFNC 55 L FiO2 65%.     8/19: Patient states that he feels better, on prone position. Reports SOB, cough. Afebrile, hemodynamically stable. On HFNC 55 L, FiO2 75%. Referral to LTAC placed.     8/20: Tolerating prone position. Afebrile, hemodynamically stable. On HFNC 60L, FiO2 100%. Tachypenic. Na 133, monitor. LFTs improving. Patient does not have insurance, LTAC is not option.    Interval Problem Update  8/21: Patient reports SOB. Afebrile, hemodynamically stable. Oxygen demand increased. On HFNC 60L 100%.  Na 133, stable.    I have personally seen and examined the patient at bedside. I discussed the plan of care with patient, bedside RN, charge RN,  and pharmacy.    Consultants/Specialty  critical care    Code Status  Full Code    Disposition  Patient is not medically cleared.   Anticipate discharge to to home with close outpatient follow-up.  I have placed the appropriate orders for post-discharge needs.    Review of Systems  Review of Systems   Constitutional: Positive for malaise/fatigue. Negative for chills and fever.   HENT: Negative.    Eyes: Negative.    Respiratory: Positive for cough and shortness of breath.    Cardiovascular: Negative for chest pain.   Gastrointestinal: Negative for abdominal pain, diarrhea, nausea and vomiting.   Genitourinary: Negative for dysuria.   Musculoskeletal: Negative for myalgias.   Skin: Negative for rash.   Neurological: Positive for weakness and headaches.     Physical Exam  Temp:  [36.5 °C (97.7 °F)-37.2 °C (98.9 °F)] 37.2  °C (98.9 °F)  Pulse:  [61-72] 69  Resp:  [18-20] 20  BP: (102-127)/(60-69) 109/68  SpO2:  [90 %-95 %] 92 %    Physical Exam  Vitals and nursing note reviewed.   Constitutional:       Appearance: He is ill-appearing.   HENT:      Head: Normocephalic.      Mouth/Throat:      Mouth: Mucous membranes are moist.      Pharynx: Oropharynx is clear.   Eyes:      Pupils: Pupils are equal, round, and reactive to light.   Cardiovascular:      Rate and Rhythm: Normal rate and regular rhythm.      Pulses: Normal pulses.   Pulmonary:      Effort: Pulmonary effort is normal.      Breath sounds: Rales present.   Abdominal:      General: Abdomen is flat. Bowel sounds are normal. There is no distension.      Tenderness: There is no abdominal tenderness.   Musculoskeletal:         General: Normal range of motion.      Cervical back: Normal range of motion.   Skin:     General: Skin is warm.   Neurological:      General: No focal deficit present.      Mental Status: He is alert and oriented to person, place, and time.     Fluids    Intake/Output Summary (Last 24 hours) at 8/21/2021 1157  Last data filed at 8/21/2021 1100  Gross per 24 hour   Intake 720 ml   Output 1775 ml   Net -1055 ml       Laboratory  Recent Labs     08/20/21  0328   WBC 7.0   RBC 5.48   HEMOGLOBIN 16.2   HEMATOCRIT 47.1   MCV 85.9   MCH 29.6   MCHC 34.4   RDW 39.4   PLATELETCT 384   MPV 9.0     Recent Labs     08/20/21  0328 08/21/21  0234   SODIUM 133* 133*   POTASSIUM 4.8 4.4   CHLORIDE 99 100   CO2 26 23   GLUCOSE 110* 104*   BUN 22 22   CREATININE 0.77 0.71   CALCIUM 8.3* 8.6                   Imaging  US-EXTREMITY VENOUS LOWER BILAT   Final Result      DX-CHEST-PORTABLE (1 VIEW)   Final Result      1.  Bilateral perihilar and lower lobe interstitial opacity which represent interstitial edema or pneumonia.      2.  No lobar consolidation.              Assessment/Plan  * Pneumonia due to COVID-19 virus- (present on admission)  Assessment & Plan  Patient 1 week  ago, on 8/8/2021, started having fevers and chills.  He went to the local emergency room in [place].  There they diagnosed him with COVID-19 infection.  He was sent home on 8/9/2021.  On day of admission he got worse with his respiratory status and thus went to the emergency room again.  They had to put him on BiPAP due to the hypoxia and the transferred him to [facility].  Here he was admitted to the ICU due to the need of BiPAP.  On Decadron  On prophylactic anticoagulation with Lovenox  Patient is unvaccinated.  Patient currently requires high amounts of oxygen.    Acute respiratory failure with hypoxia (HCC)- (present on admission)  Assessment & Plan  Secondary to bilateral COVID-19 infection  Continue HFNC, wean off as tolerated  Optimize oxygen use to keep oxygen saturations above 90%.       VTE prophylaxis: enoxaparin ppx    I have performed a physical exam and reviewed and updated ROS and Plan today (8/21/2021). In review of yesterday's note (8/20/2021), there are no changes except as documented above.

## 2021-08-21 NOTE — PROGRESS NOTES
Bedside report received from ROSALVA Ram. Assumed pt care. Pt is AOx4. Denies any pain at this time. Reports feeling SOB this morning, instructed pt to prone as much as possible. Discussed POC including supplemental oxygen, proning, IS usage. Pt verbalized understanding. Hourly rounding in place. Fall precautions in place and call light within reach.

## 2021-08-22 PROBLEM — E87.1 HYPONATREMIA: Status: ACTIVE | Noted: 2021-08-22

## 2021-08-22 PROBLEM — R79.89 ELEVATED LFTS: Status: ACTIVE | Noted: 2021-08-22

## 2021-08-22 PROBLEM — D69.6 THROMBOCYTOPENIA (HCC): Status: RESOLVED | Noted: 2021-08-16 | Resolved: 2021-08-22

## 2021-08-22 LAB
ALBUMIN SERPL BCP-MCNC: 3.2 G/DL (ref 3.2–4.9)
ALBUMIN/GLOB SERPL: 1.3 G/DL
ALP SERPL-CCNC: 81 U/L (ref 30–99)
ALT SERPL-CCNC: 129 U/L (ref 2–50)
ANION GAP SERPL CALC-SCNC: 7 MMOL/L (ref 7–16)
AST SERPL-CCNC: 70 U/L (ref 12–45)
BASOPHILS # BLD AUTO: 0.4 % (ref 0–1.8)
BASOPHILS # BLD: 0.03 K/UL (ref 0–0.12)
BILIRUB SERPL-MCNC: 0.5 MG/DL (ref 0.1–1.5)
BUN SERPL-MCNC: 23 MG/DL (ref 8–22)
CALCIUM SERPL-MCNC: 8.6 MG/DL (ref 8.4–10.2)
CHLORIDE SERPL-SCNC: 102 MMOL/L (ref 96–112)
CO2 SERPL-SCNC: 25 MMOL/L (ref 20–33)
CREAT SERPL-MCNC: 0.8 MG/DL (ref 0.5–1.4)
EOSINOPHIL # BLD AUTO: 0.17 K/UL (ref 0–0.51)
EOSINOPHIL NFR BLD: 2 % (ref 0–6.9)
ERYTHROCYTE [DISTWIDTH] IN BLOOD BY AUTOMATED COUNT: 39.9 FL (ref 35.9–50)
GLOBULIN SER CALC-MCNC: 2.4 G/DL (ref 1.9–3.5)
GLUCOSE SERPL-MCNC: 108 MG/DL (ref 65–99)
HCT VFR BLD AUTO: 46.2 % (ref 42–52)
HGB BLD-MCNC: 16 G/DL (ref 14–18)
IMM GRANULOCYTES # BLD AUTO: 0.27 K/UL (ref 0–0.11)
IMM GRANULOCYTES NFR BLD AUTO: 3.2 % (ref 0–0.9)
LYMPHOCYTES # BLD AUTO: 0.72 K/UL (ref 1–4.8)
LYMPHOCYTES NFR BLD: 8.4 % (ref 22–41)
MCH RBC QN AUTO: 29.8 PG (ref 27–33)
MCHC RBC AUTO-ENTMCNC: 34.6 G/DL (ref 33.7–35.3)
MCV RBC AUTO: 86 FL (ref 81.4–97.8)
MONOCYTES # BLD AUTO: 0.33 K/UL (ref 0–0.85)
MONOCYTES NFR BLD AUTO: 3.9 % (ref 0–13.4)
NEUTROPHILS # BLD AUTO: 7.04 K/UL (ref 1.82–7.42)
NEUTROPHILS NFR BLD: 82.1 % (ref 44–72)
NRBC # BLD AUTO: 0 K/UL
NRBC BLD-RTO: 0 /100 WBC
PLATELET # BLD AUTO: 316 K/UL (ref 164–446)
PMV BLD AUTO: 8.7 FL (ref 9–12.9)
POTASSIUM SERPL-SCNC: 4.3 MMOL/L (ref 3.6–5.5)
PROT SERPL-MCNC: 5.6 G/DL (ref 6–8.2)
RBC # BLD AUTO: 5.37 M/UL (ref 4.7–6.1)
SODIUM SERPL-SCNC: 134 MMOL/L (ref 135–145)
WBC # BLD AUTO: 8.6 K/UL (ref 4.8–10.8)

## 2021-08-22 PROCEDURE — A9270 NON-COVERED ITEM OR SERVICE: HCPCS | Performed by: INTERNAL MEDICINE

## 2021-08-22 PROCEDURE — 94640 AIRWAY INHALATION TREATMENT: CPT

## 2021-08-22 PROCEDURE — 99233 SBSQ HOSP IP/OBS HIGH 50: CPT | Performed by: STUDENT IN AN ORGANIZED HEALTH CARE EDUCATION/TRAINING PROGRAM

## 2021-08-22 PROCEDURE — 99291 CRITICAL CARE FIRST HOUR: CPT | Performed by: INTERNAL MEDICINE

## 2021-08-22 PROCEDURE — 80053 COMPREHEN METABOLIC PANEL: CPT

## 2021-08-22 PROCEDURE — 700111 HCHG RX REV CODE 636 W/ 250 OVERRIDE (IP): Performed by: HOSPITALIST

## 2021-08-22 PROCEDURE — 700102 HCHG RX REV CODE 250 W/ 637 OVERRIDE(OP): Performed by: HOSPITALIST

## 2021-08-22 PROCEDURE — 94760 N-INVAS EAR/PLS OXIMETRY 1: CPT

## 2021-08-22 PROCEDURE — 700111 HCHG RX REV CODE 636 W/ 250 OVERRIDE (IP): Performed by: STUDENT IN AN ORGANIZED HEALTH CARE EDUCATION/TRAINING PROGRAM

## 2021-08-22 PROCEDURE — 770022 HCHG ROOM/CARE - ICU (200)

## 2021-08-22 PROCEDURE — 700102 HCHG RX REV CODE 250 W/ 637 OVERRIDE(OP): Performed by: INTERNAL MEDICINE

## 2021-08-22 PROCEDURE — 85025 COMPLETE CBC W/AUTO DIFF WBC: CPT

## 2021-08-22 PROCEDURE — A9270 NON-COVERED ITEM OR SERVICE: HCPCS | Performed by: HOSPITALIST

## 2021-08-22 RX ORDER — FUROSEMIDE 10 MG/ML
40 INJECTION INTRAMUSCULAR; INTRAVENOUS
Status: DISCONTINUED | OUTPATIENT
Start: 2021-08-22 | End: 2021-08-23

## 2021-08-22 RX ADMIN — GUAIFENESIN 600 MG: 600 TABLET, EXTENDED RELEASE ORAL at 01:01

## 2021-08-22 RX ADMIN — DEXAMETHASONE 6 MG: 4 TABLET ORAL at 05:40

## 2021-08-22 RX ADMIN — ENOXAPARIN SODIUM 40 MG: 40 INJECTION SUBCUTANEOUS at 17:19

## 2021-08-22 RX ADMIN — FUROSEMIDE 40 MG: 40 INJECTION, SOLUTION INTRAMUSCULAR; INTRAVENOUS at 12:30

## 2021-08-22 RX ADMIN — Medication 1 LOZENGE: at 01:01

## 2021-08-22 ASSESSMENT — PAIN DESCRIPTION - PAIN TYPE
TYPE: ACUTE PAIN
TYPE: ACUTE PAIN;CHRONIC PAIN

## 2021-08-22 ASSESSMENT — ENCOUNTER SYMPTOMS
EYES NEGATIVE: 1
GASTROINTESTINAL NEGATIVE: 1
MUSCULOSKELETAL NEGATIVE: 1
NERVOUS/ANXIOUS: 1
WEAKNESS: 1
CARDIOVASCULAR NEGATIVE: 1
SHORTNESS OF BREATH: 1

## 2021-08-22 ASSESSMENT — FIBROSIS 4 INDEX: FIB4 SCORE: 1.13

## 2021-08-22 NOTE — PROGRESS NOTES
Hospital Medicine Daily Progress Note    Date of Service  8/22/2021    Chief Complaint  Venkat Mejía is a 56 y.o. male admitted 8/15/2021 with SOB    Hospital Course  A 56-year-old man with h[facility]o recently [facility]     8/18: Patient is transferred to telemetry from ICU. Patient reports cough. Afebrile, hemodynamically stable, tachypneic 24. On HFNC 55 L FiO2 65%.  8/19: Patient states that he feels better, on prone position. Reports SOB, cough. Afebrile, hemodynamically stable. On HFNC 55 L, FiO2 75%. Referral to LTAC placed.  8/20: Tolerating prone position. Afebrile, hemodynamically stable. On HFNC 60L, FiO2 100%. Tachypenic. Na 133, monitor. LFTs improving. Patient does not have insurance, LTAC is not option.  8/21: Patient reports SOB. Afebrile, hemodynamically stable. Oxygen demand increased. On HFNC 60L 100%.  Na 133, stable.    Interval Problem Update  8/22: Afebrile, hemodynamically stable. Oxygen demand is increasing, now on 60 L 100%+15 L oxymask. Na 134 improving. LFTs improving. Palliative consulted for GOC. Started IV lasix 40 mg.    I have personally seen and examined the patient at bedside. I discussed the plan of care with patient, bedside RN, charge RN,  and pharmacy.    Consultants/Specialty  critical care    Code Status  Full Code    Disposition  Patient is not medically cleared.   Anticipate discharge to to home with close outpatient follow-up.  I have placed the appropriate orders for post-discharge needs.    Review of Systems  Review of Systems   Constitutional: Positive for malaise/fatigue. Negative for chills and fever.   HENT: Negative.    Eyes: Negative.    Respiratory: Positive for cough and shortness of breath.    Cardiovascular: Negative for chest pain.   Gastrointestinal: Negative for abdominal pain, diarrhea, nausea and vomiting.   Genitourinary: Negative for dysuria.    Musculoskeletal: Negative for myalgias.   Skin: Negative for rash.   Neurological: Positive for weakness and headaches.     Physical Exam  Temp:  [36.4 °C (97.6 °F)-37.4 °C (99.4 °F)] 37.4 °C (99.4 °F)  Pulse:  [55-84] 71  Resp:  [18-22] 19  BP: (108-145)/(53-74) 108/65  SpO2:  [85 %-92 %] 92 %    Physical Exam  Vitals and nursing note reviewed.   Constitutional:       Appearance: He is ill-appearing.   HENT:      Head: Normocephalic.      Mouth/Throat:      Mouth: Mucous membranes are moist.      Pharynx: Oropharynx is clear.   Eyes:      Pupils: Pupils are equal, round, and reactive to light.   Cardiovascular:      Rate and Rhythm: Normal rate and regular rhythm.      Pulses: Normal pulses.   Pulmonary:      Effort: Pulmonary effort is normal.      Breath sounds: Rales present.   Abdominal:      General: Abdomen is flat. Bowel sounds are normal. There is no distension.      Tenderness: There is no abdominal tenderness.   Musculoskeletal:         General: Normal range of motion.      Cervical back: Normal range of motion.   Skin:     General: Skin is warm.   Neurological:      General: No focal deficit present.      Mental Status: He is alert and oriented to person, place, and time.     Fluids    Intake/Output Summary (Last 24 hours) at 8/22/2021 1041  Last data filed at 8/22/2021 0900  Gross per 24 hour   Intake 440 ml   Output 1150 ml   Net -710 ml       Laboratory  Recent Labs     08/20/21  0328 08/22/21  0553   WBC 7.0 8.6   RBC 5.48 5.37   HEMOGLOBIN 16.2 16.0   HEMATOCRIT 47.1 46.2   MCV 85.9 86.0   MCH 29.6 29.8   MCHC 34.4 34.6   RDW 39.4 39.9   PLATELETCT 384 316   MPV 9.0 8.7*     Recent Labs     08/20/21  0328 08/21/21  0234 08/22/21  0553   SODIUM 133* 133* 134*   POTASSIUM 4.8 4.4 4.3   CHLORIDE 99 100 102   CO2 26 23 25   GLUCOSE 110* 104* 108*   BUN 22 22 23*   CREATININE 0.77 0.71 0.80   CALCIUM 8.3* 8.6 8.6                   Imaging  US-EXTREMITY VENOUS LOWER BILAT   Final Result       DX-CHEST-PORTABLE (1 VIEW)   Final Result      1.  Bilateral perihilar and lower lobe interstitial opacity which represent interstitial edema or pneumonia.      2.  No lobar consolidation.              Assessment/Plan  * Pneumonia due to COVID-19 virus- (present on admission)  Assessment & Plan  Patient 1 week ago, on 8/8/2021, started having fevers and chills.  He went to the local emergency room in [place].  There they diagnosed him with COVID-19 infection.  He was sent home on 8/9/2021.  On day of admission he got worse with his respiratory status and thus went to the emergency room again.  They had to put him on BiPAP due to the hypoxia and the transferred him to [facility].  Here he was admitted to the ICU due to the need of BiPAP.  On Decadron  On prophylactic anticoagulation with Lovenox  Patient is unvaccinated.  Patient currently requires high amounts of oxygen.    Acute respiratory failure with hypoxia (HCC)- (present on admission)  Assessment & Plan  Secondary to bilateral COVID-19 infection  Continue HFNC, wean off as tolerated  Optimize oxygen use to keep oxygen saturations above 90%.    Elevated LFTs  Assessment & Plan  Likely due to COVID 19 infection  Improving  Monitor     Hyponatremia  Assessment & Plan  Mild  Monitor        VTE prophylaxis: enoxaparin ppx    I have performed a physical exam and reviewed and updated ROS and Plan today (8/22/2021). In review of yesterday's note (8/21/2021), there are no changes except as documented above.

## 2021-08-22 NOTE — PROGRESS NOTES
Pulmonary Progress Note    Date of admission  8/15/2021    Chief Complaint  56 y.o. male admitted 8/15/2021 with Covid pna    Hospital Course  55 yo male transferred from [place] with worsening SOB dx covid 8/8/21 and b/l infiltrates and high o2 requirement. Required BIPAP and transferred to ICU for further management  Transitioned to high flow and improved over the course of few days so transferred out of the ICU on 8/17/2021.  However patient progressed and now 100% FiO2 60 L high flow with a nonrebreather and barely maintaining sats in the mid 80%  Dexamethasone  Tiffany diuresed  He received tocilimuzab on 8/16  Not a remdesivir candidate    Interval Problem Update  Reviewed last 24 hour events:  Chart review from the past 24 hours includes imaging, laboratory studies, vital signs and notes available.  Pertinent data for today    Cardiac:  BP vitals stable  Pulm:  Has maintained to hiflo 100%/60 l and NRB  Neuro:  intact  Heme: has some nose bleeds  I/O:   -1710 since admit  ID: COVID pna  On decadron 8/15  CRP 19.85 on 8/15 so received tocilimuzab on 8/16  On lasix  GI/endo:  Able to eat  Labs/Imaging:  reviewed  Lines:  none  Mobility:  Prone 16 hrs  Symptoms:see below      Review of Systems  Review of Systems   Constitutional: Positive for malaise/fatigue.   HENT: Negative.    Eyes: Negative.    Respiratory: Positive for shortness of breath.    Cardiovascular: Negative.    Gastrointestinal: Negative.    Genitourinary: Negative.    Musculoskeletal: Negative.    Skin: Negative.    Neurological: Positive for weakness.   Endo/Heme/Allergies: Negative.    Psychiatric/Behavioral: The patient is nervous/anxious.         Vital Signs for last 24 hours   Temp:  [36.4 °C (97.6 °F)-37.4 °C (99.4 °F)] 37.2 °C (99 °F)  Pulse:  [65-84] 65  Resp:  [18-22] 18  BP: (108-145)/(65-76) 119/76  SpO2:  [85 %-95 %] 92 %         Physical Exam   Physical Exam  Constitutional:       Appearance: He is ill-appearing.   HENT:       Head: Normocephalic and atraumatic.      Nose: Nose normal.   Eyes:      Pupils: Pupils are equal, round, and reactive to light.   Cardiovascular:      Rate and Rhythm: Normal rate.   Pulmonary:      Comments: tacypnea  proned on hiflo  Abdominal:      General: Bowel sounds are normal.      Palpations: Abdomen is soft.   Musculoskeletal:         General: No tenderness.   Skin:     General: Skin is warm and dry.   Neurological:      General: No focal deficit present.      Mental Status: He is alert and oriented to person, place, and time.   Psychiatric:         Mood and Affect: Mood normal.         Behavior: Behavior normal.         Medications  Current Facility-Administered Medications   Medication Dose Route Frequency Provider Last Rate Last Admin   • furosemide (LASIX) injection 40 mg  40 mg Intravenous Q DAY Jose Angel Maza M.D.   40 mg at 08/22/21 1230   • sodium chloride (OCEAN) 0.65 % nasal spray 2 Spray  2 Spray Nasal Q2HRS PRN Jose Angel Maza M.D.   2 [place] at 08/21/21 2054   • calcium carbonate (TUMS) chewable tab 500 mg  500 mg Oral TID PRN Brown Moreno M.D.   500 mg at 08/20/21 2123   • menthol (HALLS) lozenge 1 Lozenge  1 Lozenge Oral Q2HRS PRN Michele Guajardo D.OFrederick   1 Lozenge at 08/22/21 0101   • benzonatate (TESSALON) capsule 100 mg  100 mg Oral TID PRN Michele Guajardo D.OFrederick   100 mg at 08/21/21 0334   • ondansetron (ZOFRAN) syringe/vial injection 4 mg  4 mg Intravenous Q6HRS PRN Rohini Lr M.D.       • ondansetron (ZOFRAN ODT) dispertab 4 mg  4 mg Oral Q6HRS PRN Rohini Lr M.D.       • enoxaparin (LOVENOX) inj 40 mg  40 mg Subcutaneous DAILY Rohini Lr M.D.   40 mg at 08/21/21 1703   • acetaminophen (Tylenol) tablet 650 mg  650 mg Oral Q6HRS PRN Rohini Lr M.D.       • labetalol (NORMODYNE/TRANDATE) injection 10 mg  10 mg Intravenous Q6HRS PRN Rohini Lr M.D.       • hydrALAZINE (APRESOLINE) injection 10 mg  10 mg Intravenous Q6HRS PRN Rohini Lr M.D.       •  guaiFENesin ER (MUCINEX) tablet 600 mg  600 mg Oral BID PRN Rohini Lr M.D.   600 mg at 08/22/21 0101   • dexamethasone (DECADRON) tablet 6 mg  6 mg Oral DAILY Rohini Lr M.D.   6 mg at 08/22/21 0540       Fluids    Intake/Output Summary (Last 24 hours) at 8/22/2021 1650  Last data filed at 8/22/2021 1438  Gross per 24 hour   Intake 440 ml   Output 1100 ml   Net -660 ml       Laboratory          Recent Labs     08/20/21  0328 08/21/21  0234 08/22/21  0553   SODIUM 133* 133* 134*   POTASSIUM 4.8 4.4 4.3   CHLORIDE 99 100 102   CO2 26 23 25   BUN 22 22 23*   CREATININE 0.77 0.71 0.80   CALCIUM 8.3* 8.6 8.6     Recent Labs     08/20/21  0328 08/21/21  0234 08/22/21  0553   ALTSGPT 169*  --  129*   ASTSGOT 87*  --  70*   ALKPHOSPHAT 61  --  81   TBILIRUBIN 0.4  --  0.5   GLUCOSE 110* 104* 108*     Recent Labs     08/20/21  0328 08/22/21  0553   WBC 7.0 8.6   NEUTSPOLYS 80.40* 82.10*   LYMPHOCYTES 10.00* 8.40*   MONOCYTES 2.70 3.90   EOSINOPHILS 2.80 2.00   BASOPHILS 0.70 0.40   ASTSGOT 87* 70*   ALTSGPT 169* 129*   ALKPHOSPHAT 61 81   TBILIRUBIN 0.4 0.5     Recent Labs     08/20/21  0328 08/22/21  0553   RBC 5.48 5.37   HEMOGLOBIN 16.2 16.0   HEMATOCRIT 47.1 46.2   PLATELETCT 384 316       Imaging  B/l infiltrates mainly peripherally cxr on 8/15     Assessment/Plan  * Pneumonia due to COVID-19 virus- (present on admission)  Assessment & Plan  With hypoxemia needed bipap- transition to hiflo 8/16 improved and transferred to floor 8/17 but now worse on 8/22 and maxed on hiflo 100%/60 L and NRB  Does not qualify for remdesivir  Qualifies for tocilimuzab and counseled pt on tocilimuzab and received on 8/16  On decadron started 8/15  D dimer high on 8/17 dopplers negative for clot  Strict droplet and contact isolation  Prone 16 hrs    Elevated LFTs- (present on admission)  Assessment & Plan  Secondary to covid  Improving slowly    Hyponatremia- (present on admission)  Assessment & Plan  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 In review of yesterday's note (8/21/2021), there are no changes except as documented above.     Discussed patient condition and risk of morbidity and/or mortality with Charge nurse / hot rounds and Dr. Domínguez     Discussed patient condition and risk of morbidity and/or mortality with Charge nurse / hot rounds  The patient remains critically ill.  Critical care time = 32 minutes in directly providing and coordinating critical care and extensive data review.  No time overlap and excludes procedures.

## 2021-08-22 NOTE — FLOWSHEET NOTE
08/22/21 0645   Vital Signs   Pulse Oximetry 89 %   $ Pulse Oximetry (Spot Check) Yes   Oxygen   O2 (LPM) 60   FiO2% 100 %   O2 Delivery Device Heated High Flow Nasal Cannula   Aerosols   $ Aerosol Delivery Device Heated High Flow Nasal Cannula   Aerosol Temperature 31 °C (87.8 °F)

## 2021-08-22 NOTE — PROGRESS NOTES
Telemetry Shift Summary    Rhythm SR  HR Range 60s-80s  Ectopy rare PVCs  Measurements 0.20/0.10/0.40        Normal Values  Rhythm SR  HR Range    Measurements 0.12-0.20 / 0.06-0.10  / 0.30-0.52

## 2021-08-22 NOTE — PROGRESS NOTES
Tele strip printed at 1448     Rhythm: SR   HR: 70  Measurements: 0.12/0.10/0.36        Telemetry Shift Summary     Rhythm: SR/SB  HR Range: 50's-70's  Ectopy: None     Telemetry monitoring strips placed in pt's chart.

## 2021-08-22 NOTE — PROGRESS NOTES
Pt transferred to ICU to higher level of care. Assessment reviewed and agreed with Tia BLACKWELL.

## 2021-08-22 NOTE — CARE PLAN
The patient is Stable - Low risk of patient condition declining or worsening    Shift Goals  Clinical Goals: decrease O2  Patient Goals: rest    Progress made toward(s) clinical / shift goals:  still requiring HHFNC and oxymask    Patient is not progressing towards the following goals:unable to decrease oxygen need      Problem: Knowledge Deficit - Standard  Goal: Patient and family/care givers will demonstrate understanding of plan of care, disease process/condition, diagnostic tests and medications  Outcome: Progressing  Educated on proning, use of IS, and ambulation     Problem: Psychosocial  Goal: Patient's level of anxiety will decrease  Outcome: Progressing  No c/o anxiety at this time.     Problem: Communication  Goal: The ability to communicate needs accurately and effectively will improve  Outcome: Progressing  Whiteboard updated     Problem: Respiratory  Goal: Patient will achieve/maintain optimum respiratory ventilation and gas exchange  Outcome: Progressing   Encouraged breathing techniques

## 2021-08-22 NOTE — CARE PLAN
Problem: Urinary Elimination  Goal: Establish and maintain regular urinary output  Outcome: Progressing     Problem: Mobility  Goal: Patient's capacity to carry out activities will improve  Outcome: Progressing   The patient is Watcher - Medium risk of patient condition declining or worsening    Shift Goals  Clinical Goals: Decrease o2 demand, proning, IS usage  Patient Goals: rest, breathe easier    Progress made toward(s) clinical / shift goals:  Discussed mobility and exercise with pt, pt has been performed bed-side exercises.      Patient is not progressing towards the following goals:

## 2021-08-22 NOTE — PROGRESS NOTES
0700 Received report from ROSALVA Ram. Pt is resting on HHFNC and oxymask. Safety and isolation orders in place. Care assumed.     0820 Pt assessment complete. Education provided on proning, IS, and mobilizing. Pt left in the prone position.     1230 Pt marched in place with RN. Saturations during the active were in the 80s asymptomatic. Pt was able to recover once sitting.

## 2021-08-23 PROBLEM — J96.01 ACUTE HYPOXEMIC RESPIRATORY FAILURE DUE TO COVID-19 (HCC): Status: ACTIVE | Noted: 2021-08-15

## 2021-08-23 LAB
ALBUMIN SERPL BCP-MCNC: 3.2 G/DL (ref 3.2–4.9)
ALBUMIN/GLOB SERPL: 1.3 G/DL
ALP SERPL-CCNC: 78 U/L (ref 30–99)
ALT SERPL-CCNC: 99 U/L (ref 2–50)
ANION GAP SERPL CALC-SCNC: 9 MMOL/L (ref 7–16)
AST SERPL-CCNC: 44 U/L (ref 12–45)
BASOPHILS # BLD AUTO: 0.9 % (ref 0–1.8)
BASOPHILS # BLD: 0.08 K/UL (ref 0–0.12)
BILIRUB SERPL-MCNC: 0.6 MG/DL (ref 0.1–1.5)
BUN SERPL-MCNC: 28 MG/DL (ref 8–22)
CALCIUM SERPL-MCNC: 8.9 MG/DL (ref 8.4–10.2)
CHLORIDE SERPL-SCNC: 100 MMOL/L (ref 96–112)
CO2 SERPL-SCNC: 24 MMOL/L (ref 20–33)
CREAT SERPL-MCNC: 0.8 MG/DL (ref 0.5–1.4)
D DIMER PPP IA.FEU-MCNC: >20 UG/ML (FEU) (ref 0–0.5)
EOSINOPHIL # BLD AUTO: 0.18 K/UL (ref 0–0.51)
EOSINOPHIL NFR BLD: 2 % (ref 0–6.9)
ERYTHROCYTE [DISTWIDTH] IN BLOOD BY AUTOMATED COUNT: 42.4 FL (ref 35.9–50)
GLOBULIN SER CALC-MCNC: 2.5 G/DL (ref 1.9–3.5)
GLUCOSE SERPL-MCNC: 104 MG/DL (ref 65–99)
HCT VFR BLD AUTO: 48.6 % (ref 42–52)
HGB BLD-MCNC: 16.2 G/DL (ref 14–18)
IMM GRANULOCYTES # BLD AUTO: 0.26 K/UL (ref 0–0.11)
IMM GRANULOCYTES NFR BLD AUTO: 2.9 % (ref 0–0.9)
LYMPHOCYTES # BLD AUTO: 0.8 K/UL (ref 1–4.8)
LYMPHOCYTES NFR BLD: 8.8 % (ref 22–41)
MCH RBC QN AUTO: 29.5 PG (ref 27–33)
MCHC RBC AUTO-ENTMCNC: 33.3 G/DL (ref 33.7–35.3)
MCV RBC AUTO: 88.5 FL (ref 81.4–97.8)
MONOCYTES # BLD AUTO: 0.41 K/UL (ref 0–0.85)
MONOCYTES NFR BLD AUTO: 4.5 % (ref 0–13.4)
NEUTROPHILS # BLD AUTO: 7.36 K/UL (ref 1.82–7.42)
NEUTROPHILS NFR BLD: 80.9 % (ref 44–72)
NRBC # BLD AUTO: 0 K/UL
NRBC BLD-RTO: 0 /100 WBC
PLATELET # BLD AUTO: 299 K/UL (ref 164–446)
PMV BLD AUTO: 8.8 FL (ref 9–12.9)
POTASSIUM SERPL-SCNC: 4.3 MMOL/L (ref 3.6–5.5)
PROT SERPL-MCNC: 5.7 G/DL (ref 6–8.2)
RBC # BLD AUTO: 5.49 M/UL (ref 4.7–6.1)
SODIUM SERPL-SCNC: 133 MMOL/L (ref 135–145)
WBC # BLD AUTO: 9.1 K/UL (ref 4.8–10.8)

## 2021-08-23 PROCEDURE — 85025 COMPLETE CBC W/AUTO DIFF WBC: CPT

## 2021-08-23 PROCEDURE — 99291 CRITICAL CARE FIRST HOUR: CPT | Performed by: INTERNAL MEDICINE

## 2021-08-23 PROCEDURE — 770022 HCHG ROOM/CARE - ICU (200)

## 2021-08-23 PROCEDURE — 85379 FIBRIN DEGRADATION QUANT: CPT

## 2021-08-23 PROCEDURE — 94640 AIRWAY INHALATION TREATMENT: CPT

## 2021-08-23 PROCEDURE — 700102 HCHG RX REV CODE 250 W/ 637 OVERRIDE(OP): Performed by: INTERNAL MEDICINE

## 2021-08-23 PROCEDURE — A9270 NON-COVERED ITEM OR SERVICE: HCPCS | Performed by: HOSPITALIST

## 2021-08-23 PROCEDURE — 700102 HCHG RX REV CODE 250 W/ 637 OVERRIDE(OP): Performed by: HOSPITALIST

## 2021-08-23 PROCEDURE — 700111 HCHG RX REV CODE 636 W/ 250 OVERRIDE (IP): Performed by: STUDENT IN AN ORGANIZED HEALTH CARE EDUCATION/TRAINING PROGRAM

## 2021-08-23 PROCEDURE — 94760 N-INVAS EAR/PLS OXIMETRY 1: CPT

## 2021-08-23 PROCEDURE — 700111 HCHG RX REV CODE 636 W/ 250 OVERRIDE (IP): Performed by: INTERNAL MEDICINE

## 2021-08-23 PROCEDURE — 80053 COMPREHEN METABOLIC PANEL: CPT

## 2021-08-23 PROCEDURE — A9270 NON-COVERED ITEM OR SERVICE: HCPCS | Performed by: INTERNAL MEDICINE

## 2021-08-23 RX ORDER — OMEPRAZOLE 20 MG/1
20 CAPSULE, DELAYED RELEASE ORAL DAILY
Status: DISCONTINUED | OUTPATIENT
Start: 2021-08-23 | End: 2021-08-31 | Stop reason: HOSPADM

## 2021-08-23 RX ORDER — FUROSEMIDE 10 MG/ML
40 INJECTION INTRAMUSCULAR; INTRAVENOUS EVERY 12 HOURS
Status: DISCONTINUED | OUTPATIENT
Start: 2021-08-23 | End: 2021-08-28

## 2021-08-23 RX ADMIN — ENOXAPARIN SODIUM 80 MG: 80 INJECTION SUBCUTANEOUS at 17:14

## 2021-08-23 RX ADMIN — FUROSEMIDE 40 MG: 40 INJECTION, SOLUTION INTRAMUSCULAR; INTRAVENOUS at 06:00

## 2021-08-23 RX ADMIN — FUROSEMIDE 40 MG: 40 INJECTION, SOLUTION INTRAMUSCULAR; INTRAVENOUS at 17:14

## 2021-08-23 RX ADMIN — DEXAMETHASONE 6 MG: 4 TABLET ORAL at 05:59

## 2021-08-23 RX ADMIN — OMEPRAZOLE 20 MG: 20 CAPSULE, DELAYED RELEASE ORAL at 13:41

## 2021-08-23 ASSESSMENT — ENCOUNTER SYMPTOMS
GASTROINTESTINAL NEGATIVE: 1
MUSCULOSKELETAL NEGATIVE: 1
NERVOUS/ANXIOUS: 1
CARDIOVASCULAR NEGATIVE: 1
WEAKNESS: 1
SHORTNESS OF BREATH: 1
EYES NEGATIVE: 1

## 2021-08-23 ASSESSMENT — PAIN DESCRIPTION - PAIN TYPE: TYPE: ACUTE PAIN

## 2021-08-23 NOTE — CONSULTS
"Reason for PC Consult: Advance Care Planning    Consulted by: Dr. Maza & Dr. Samaniego    Assessment:  General: Pt is 56 year old gentleman admitted with COVID PNA 8/15/2021 as a transfer from [place] for increased SOB.  Patient tested positive for COVID 8/8/2021 Patient with bilateral infiltrates with high oxygen needs.  Currently on high flow with increased LFT's,and hyponatremia.     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  Pt has an adult daughter Eleanor Thomson and a 13 year old son Houston that lives in [place].    Dyspnea: Yes-High Flow oxygen   Last BM: 08/22/21-    Pain: No-  Pt denies  Depression: Mood appropriate for situation-  \"I'm feeling better today\"  Dementia: No;       Spiritual:  Is Gnosticist or spirituality important for coping with this illness? Yes-    Has a  or spiritual provider visit been requested? Yes. Will request a visit.    Palliative Performance Scale: 50%    Advance Directive: None- Executed today pt elected his daughter Janel Thomson ad his agent no alternates. Selected statement of desire 2, 3, & 5. Will ask for [facility] services to complete.   DPOA:  -  NOK daughter Eleanor Thomson  POLST: No-      Code Status: Full- Confirmed with pt.     Outcome:  Introduced self and role of Palliative Care utilizing  [id number] Josee.   Explored patient's values, beliefs, and preferences in order to determine the goals of care and plan of care.  Patient feels like he is progressing and is doing better. Pt wants to be there for his 13 year old son in [place]. Pt would be intubated is it is \"necessary\". Wants to be a full code. Educated pt about the purpose of a DPOA-HC.  Completed with pt.   Active listening, reflection, validation, normalization, and empathic support utilized throughout this encounter.  All questions answered.      Recommendations: Hospice referral not recommended pt is seeking curative treatment.    Updated: Niecy Juares Rn CM & Laverne " RN    Plan: DPOA-HC to be notarized.  Will continue to provide additional support as needed.    Thank you for allowing Palliative Care to participate in this patient's care. Please feel free to call [phone] with any questions or concerns.

## 2021-08-23 NOTE — PROGRESS NOTES
Telemetry Shift Summary    Rhythm SR  HR Range 61-71  Ectopy rPVC, PAC, coup  Measurements 0.14/0.10/0.36        Normal Values  Rhythm SR  HR Range    Measurements 0.12-0.20 / 0.06-0.10  / 0.30-0.52

## 2021-08-23 NOTE — FLOWSHEET NOTE
08/23/21 1020   Events/Summary/Plan   Skin Inspection Respiratory Device Intact   Vital Signs   Pulse 69   Respiration (!) 33   Pulse Oximetry 93 %   $ Pulse Oximetry (Spot Check) Yes   Oxygen   O2 (LPM) 65   FiO2% 100 %   O2 Delivery Device Heated High Flow Nasal Cannula   Aerosols   $ Aerosol Delivery Device Heated High Flow Nasal Cannula   Aerosol Temperature 31 °C (87.8 °F)

## 2021-08-23 NOTE — DISCHARGE PLANNING
Anticipated Discharge Disposition:   Home when medically cleared     Action:   Chart review complete.     Per chart review, this patient remains on a HFNC and is not medically cleared for discharge.     1445: RN CM asked leadership, Ijeoma Whipple, if RAYMUNDO for lTAC is possible.    1520: RN CM asked bedside RN to give patient PFA's number. Email sent to PFA to have patient screened for emergency medicaid. Adenike Dodd and Ijeoma Billings CC'd on request.     Barriers to Discharge:   Medical Clearance    Plan:   Hospital care management will continue to assist with discharge planning needs.

## 2021-08-23 NOTE — FLOWSHEET NOTE
08/23/21 1414   Vital Signs   Pulse 66   Respiration (!) 36   Pulse Oximetry 94 %   $ Pulse Oximetry (Spot Check) Yes   Oxygen   O2 (LPM) 60   FiO2% 100 %   O2 Delivery Device Heated High Flow Nasal Cannula   Aerosols   $ Aerosol Delivery Device Heated High Flow Nasal Cannula

## 2021-08-23 NOTE — PROGRESS NOTES
Critical Care Progress Note    Date of admission  8/15/2021    Chief Complaint  56 y.o. male admitted 8/15/2021 with Covid pna    Hospital Course  55 yo male transferred from [place] with worsening SOB dx covid 8/8/21 and b/l infiltrates and high o2 requirement. Required BIPAP and transferred to ICU for further management  Transitioned to high flow and improved over the course of few days so transferred out of the ICU on 8/17/2021.  However patient progressed and now 100% FiO2 60 L high flow with a nonrebreather and barely maintaining sats in the mid 80%  Dexamethasone  Being diuresed  Received tocilimuzab on 8/16  Not a remdesivir candidate    Interval Problem Update  Reviewed last 24 hour events:  Chart review from the past 24 hours includes imaging, laboratory studies, vital signs and notes available.  Pertinent data for today    Cardiac:  BP stable  Pulm:  Hiflo 100%/60 l and NRB, sats high 80s, self prones  Neuro:  intact  Heme: has some nosebleeds, CBC stable, D-dimer > 20  I/O:   -2.8L since admit, lasix 40mg IV QD  ID: COVID pna, on decadron,  received tocilimuzab on 8/16, procal 0.32, CRP 19 on admit  GI/endo:  Able to eat  Labs/Imaging:  reviewed  Lines:  none  Mobility:  Prone 16 hrs  Symptoms:see below    Review of Systems  Review of Systems   Constitutional: Positive for malaise/fatigue.   HENT: Negative.    Eyes: Negative.    Respiratory: Positive for shortness of breath.    Cardiovascular: Negative.    Gastrointestinal: Negative.    Genitourinary: Negative.    Musculoskeletal: Negative.    Skin: Negative.    Neurological: Positive for weakness.   Endo/Heme/Allergies: Negative.    Psychiatric/Behavioral: The patient is nervous/anxious.         Vital Signs for last 24 hours   Temp:  [37 °C (98.6 °F)-37.3 °C (99.1 °F)] 37.2 °C (98.9 °F)  Pulse:  [56-78] 67  Resp:  [17-69] 37  BP: ()/(59-84) 94/59  SpO2:  [85 %-95 %] 92 %         Physical Exam   Physical Exam  Constitutional:        Appearance: He is ill-appearing.   HENT:      Head: Normocephalic and atraumatic.      Nose: Nose normal.   Eyes:      Pupils: Pupils are equal, round, and reactive to light.   Cardiovascular:      Rate and Rhythm: Normal rate.   Pulmonary:      Comments: tacypnea  Sitting up in chair, on high flow and NRB  Abdominal:      General: Bowel sounds are normal.      Palpations: Abdomen is soft.   Musculoskeletal:         General: No tenderness.   Skin:     General: Skin is warm and dry.   Neurological:      General: No focal deficit present.      Mental Status: He is alert and oriented to person, place, and time.   Psychiatric:         Mood and Affect: Mood normal.         Behavior: Behavior normal.       Medications  Current Facility-Administered Medications   Medication Dose Route Frequency Provider Last Rate Last Admin   • furosemide (LASIX) injection 40 mg  40 mg Intravenous Q12HRS Agsutín Samaniego M.D.       • enoxaparin (LOVENOX) inj 80 mg  80 mg Subcutaneous Q12HRS Agustín Samaniego M.D.       • omeprazole (PRILOSEC) capsule 20 mg  20 mg Oral DAILY Agustín Samaniego M.D.       • sodium chloride (OCEAN) 0.65 % nasal spray 2 Spray  2 Spray Nasal Q2HRS PRN Jose Angel Maza M.D.   2 [place] at 08/21/21 2054   • calcium carbonate (TUMS) chewable tab 500 mg  500 mg Oral TID PRN Brown Moreno M.D.   500 mg at 08/20/21 2123   • menthol (HALLS) lozenge 1 Lozenge  1 Lozenge Oral Q2HRS PRN HARRISON KelleyOFrederick   1 Lozenge at 08/22/21 0101   • benzonatate (TESSALON) capsule 100 mg  100 mg Oral TID PRN HARRISON KelleyOFrederick   100 mg at 08/21/21 0334   • ondansetron (ZOFRAN) syringe/vial injection 4 mg  4 mg Intravenous Q6HRS PRN Rohini Lr M.D.       • ondansetron (ZOFRAN ODT) dispertab 4 mg  4 mg Oral Q6HRS PRN Rohini Lr M.D.       • acetaminophen (Tylenol) tablet 650 mg  650 mg Oral Q6HRS PRN Rohini Lr M.D.       • labetalol (NORMODYNE/TRANDATE) injection 10 mg  10 mg Intravenous Q6HRS PRN Rohini Lr,  M.D.       • hydrALAZINE (APRESOLINE) injection 10 mg  10 mg Intravenous Q6HRS PRN Rohini Lr M.D.       • guaiFENesin ER (MUCINEX) tablet 600 mg  600 mg Oral BID PRN Rohini Lr M.D.   600 mg at 08/22/21 0101   • dexamethasone (DECADRON) tablet 6 mg  6 mg Oral DAILY Rohini Lr M.D.   6 mg at 08/23/21 0559       Fluids    Intake/Output Summary (Last 24 hours) at 8/23/2021 1318  Last data filed at 8/23/2021 0800  Gross per 24 hour   Intake 560 ml   Output 950 ml   Net -390 ml       Laboratory          Recent Labs     08/21/21  0234 08/22/21  0553 08/23/21  0512   SODIUM 133* 134* 133*   POTASSIUM 4.4 4.3 4.3   CHLORIDE 100 102 100   CO2 23 25 24   BUN 22 23* 28*   CREATININE 0.71 0.80 0.80   CALCIUM 8.6 8.6 8.9     Recent Labs     08/21/21  0234 08/22/21  0553 08/23/21  0512   ALTSGPT  --  129* 99*   ASTSGOT  --  70* 44   ALKPHOSPHAT  --  81 78   TBILIRUBIN  --  0.5 0.6   GLUCOSE 104* 108* 104*     Recent Labs     08/22/21  0553 08/23/21  0512   WBC 8.6 9.1   NEUTSPOLYS 82.10* 80.90*   LYMPHOCYTES 8.40* 8.80*   MONOCYTES 3.90 4.50   EOSINOPHILS 2.00 2.00   BASOPHILS 0.40 0.90   ASTSGOT 70* 44   ALTSGPT 129* 99*   ALKPHOSPHAT 81 78   TBILIRUBIN 0.5 0.6     Recent Labs     08/22/21  0553 08/23/21  0512   RBC 5.37 5.49   HEMOGLOBIN 16.0 16.2   HEMATOCRIT 46.2 48.6   PLATELETCT 316 299     Imaging  B/l infiltrates mainly peripherally cxr on 8/15 personally reviewed, no imaging since    Assessment/Plan    * Acute hypoxemic respiratory failure due to COVID-19 (HCC)- (present on admission)  Assessment & Plan  Needed bipap, transferred to floor 8/17, back to ICU 8/22 maxed on hiflo 100%/60 L and NRB  Decadron 8/15  Tocilimuzab 8/16  Transamininits but improving, remdesivir not ideal  D dimer > 20 -> nosebleeds resolved. Risks/benefits discussed, start therapeutic lovenox  Increase lasix 40mg IV BID  Strict droplet and contact isolation  Cont prone/pray positioning 16:8    Elevated LFTs- (present on  admission)  Assessment & Plan  Secondary to covid  Improving slowly    Hyponatremia- (present on admission)  Assessment & Plan  Mild from SIADH from lung injury from covid  monitor     VTE:  Lovenox BID  Ulcer: PPI while on steroids + therapeutic AC  Lines: None    I have performed a physical exam and reviewed and updated ROS and Plan today (8/23/2021). In review of yesterday's note (8/22/2021), there are no changes except as documented above.     Discussed patient condition and risk of morbidity and/or mortality with RN, RT, Charge nurse / hot rounds and Patient     The patient remains critically ill.  Critical care time = 35 minutes in directly providing and coordinating critical care and extensive data review.  No time overlap and excludes procedures.    This note was generated using voice recognition software which has a chance of producing errors of grammar and content.  I have made every reasonable attempt to find and correct any errors, but it should be expected that some may not be found prior to finalization of this note.  __________  Agustín Samaniego MD  Pulmonary and Critical Care Medicine  [facility]

## 2021-08-23 NOTE — FLOWSHEET NOTE
08/23/21 0628   Vital Signs   Pulse 66   Respiration (!) 32   Pulse Oximetry 90 %   $ Pulse Oximetry (Spot Check) Yes   Oxygen   O2 (LPM) 60  (plus15 l/m oxy)   FiO2% 100 %   O2 Delivery Device Heated High Flow Nasal Cannula   Aerosols   $ Aerosol Delivery Device Heated High Flow Nasal Cannula   Aerosol Temperature 31 °C (87.8 °F)

## 2021-08-23 NOTE — CARE PLAN
The patient is Watcher - Medium risk of patient condition declining or worsening    Shift Goals  Clinical Goals: decrease O2  Patient Goals: rest    Progress made toward(s) clinical / shift goals:  oxygenation    Patient is not progressing towards the following goals:

## 2021-08-24 PROCEDURE — 770020 HCHG ROOM/CARE - TELE (206)

## 2021-08-24 PROCEDURE — 700102 HCHG RX REV CODE 250 W/ 637 OVERRIDE(OP): Performed by: HOSPITALIST

## 2021-08-24 PROCEDURE — 94760 N-INVAS EAR/PLS OXIMETRY 1: CPT

## 2021-08-24 PROCEDURE — 94640 AIRWAY INHALATION TREATMENT: CPT

## 2021-08-24 PROCEDURE — 99291 CRITICAL CARE FIRST HOUR: CPT | Performed by: INTERNAL MEDICINE

## 2021-08-24 PROCEDURE — 700102 HCHG RX REV CODE 250 W/ 637 OVERRIDE(OP): Performed by: INTERNAL MEDICINE

## 2021-08-24 PROCEDURE — 700111 HCHG RX REV CODE 636 W/ 250 OVERRIDE (IP): Performed by: INTERNAL MEDICINE

## 2021-08-24 PROCEDURE — A9270 NON-COVERED ITEM OR SERVICE: HCPCS | Performed by: INTERNAL MEDICINE

## 2021-08-24 PROCEDURE — A9270 NON-COVERED ITEM OR SERVICE: HCPCS | Performed by: HOSPITALIST

## 2021-08-24 RX ORDER — GABAPENTIN 300 MG/1
300 CAPSULE ORAL 3 TIMES DAILY PRN
Status: DISCONTINUED | OUTPATIENT
Start: 2021-08-24 | End: 2021-08-31 | Stop reason: HOSPADM

## 2021-08-24 RX ADMIN — FUROSEMIDE 40 MG: 40 INJECTION, SOLUTION INTRAMUSCULAR; INTRAVENOUS at 06:38

## 2021-08-24 RX ADMIN — FUROSEMIDE 40 MG: 40 INJECTION, SOLUTION INTRAMUSCULAR; INTRAVENOUS at 17:17

## 2021-08-24 RX ADMIN — ENOXAPARIN SODIUM 80 MG: 80 INJECTION SUBCUTANEOUS at 06:38

## 2021-08-24 RX ADMIN — ENOXAPARIN SODIUM 80 MG: 80 INJECTION SUBCUTANEOUS at 17:17

## 2021-08-24 RX ADMIN — GABAPENTIN 300 MG: 300 CAPSULE ORAL at 16:05

## 2021-08-24 RX ADMIN — DEXAMETHASONE 6 MG: 4 TABLET ORAL at 06:39

## 2021-08-24 RX ADMIN — OMEPRAZOLE 20 MG: 20 CAPSULE, DELAYED RELEASE ORAL at 06:39

## 2021-08-24 RX ADMIN — ACETAMINOPHEN 650 MG: 325 TABLET, FILM COATED ORAL at 11:00

## 2021-08-24 ASSESSMENT — ENCOUNTER SYMPTOMS
CARDIOVASCULAR NEGATIVE: 1
WEAKNESS: 1
GASTROINTESTINAL NEGATIVE: 1
NERVOUS/ANXIOUS: 1
MUSCULOSKELETAL NEGATIVE: 1
EYES NEGATIVE: 1
SHORTNESS OF BREATH: 1

## 2021-08-24 ASSESSMENT — PAIN DESCRIPTION - PAIN TYPE: TYPE: ACUTE PAIN

## 2021-08-24 ASSESSMENT — FIBROSIS 4 INDEX: FIB4 SCORE: 0.86

## 2021-08-24 NOTE — FLOWSHEET NOTE
08/24/21 1105   Vital Signs   Pulse 67   Respiration (!) 36   Pulse Oximetry 92 %   $ Pulse Oximetry (Spot Check) Yes   Oxygen   O2 (LPM) 50   FiO2% 60 %   O2 Delivery Device Heated High Flow Nasal Cannula

## 2021-08-24 NOTE — PROGRESS NOTES
Critical Care Progress Note    Date of admission  8/15/2021    Chief Complaint  56 y.o. male admitted 8/15/2021 with Covid pna    Hospital Course  55 yo male transferred from [place] with worsening SOB dx covid 8/8/21 and b/l infiltrates and high o2 requirement. Required BIPAP and transferred to ICU for further management  Transitioned to high flow and improved over the course of few days so transferred out of the ICU on 8/17/2021.  However patient progressed and now 100% FiO2 60 L high flow with a nonrebreather and barely maintaining sats in the mid 80%  Dexamethasone  Being diuresed  Received tocilimuzab on 8/16  Not a remdesivir candidate    Interval Problem Update  Reviewed last 24 hour events:  Chart review from the past 24 hours includes imaging, laboratory studies, vital signs and notes available.  Pertinent data for today    Cardiac:  BP stable, SR  Pulm:  Hiflo 100%/60 l -> off NRB and down to 60% FiO2, sats mid 90s, self prones  Neuro:  intact  Heme: no nosebleeds, started therapeutic lovenox for Ddimer > 20  I/O:   -4L since admit, lasix 40mg IV BID  ID: COVID pna, on decadron,  received tocilimuzab on 8/16, procal 0.32, CRP 19 on admit  GI/endo:  PO diet  Labs/Imaging:  reviewed  Lines:  none  Mobility:  Prone 16 hrs  Symptoms: slight amount of blood streaked sputum    Review of Systems  Review of Systems   Constitutional: Positive for malaise/fatigue.   HENT: Negative.    Eyes: Negative.    Respiratory: Positive for shortness of breath.    Cardiovascular: Negative.    Gastrointestinal: Negative.    Genitourinary: Negative.    Musculoskeletal: Negative.    Skin: Negative.    Neurological: Positive for weakness.   Endo/Heme/Allergies: Negative.    Psychiatric/Behavioral: The patient is nervous/anxious.         Vital Signs for last 24 hours   Temp:  [37.2 °C (98.9 °F)-37.4 °C (99.3 °F)] 37.3 °C (99.2 °F)  Pulse:  [57-78] 60  Resp:  [5-37] 26  BP: ()/(49-83) 118/69  SpO2:  [86 %-97 %] 95 %          Physical Exam   Physical Exam  Constitutional:       Appearance: He is ill-appearing.   HENT:      Head: Normocephalic and atraumatic.      Nose: Nose normal.   Eyes:      Pupils: Pupils are equal, round, and reactive to light.   Cardiovascular:      Rate and Rhythm: Normal rate.   Pulmonary:      Comments: tacypnea  Sitting up in chair, on high flow and NRB  Abdominal:      General: Bowel sounds are normal.      Palpations: Abdomen is soft.   Musculoskeletal:         General: No tenderness.   Skin:     General: Skin is warm and dry.   Neurological:      General: No focal deficit present.      Mental Status: He is alert and oriented to person, place, and time.   Psychiatric:         Mood and Affect: Mood normal.         Behavior: Behavior normal.       Medications  Current Facility-Administered Medications   Medication Dose Route Frequency Provider Last Rate Last Admin   • furosemide (LASIX) injection 40 mg  40 mg Intravenous Q12HRS Agustín Samaniego M.D.   40 mg at 08/24/21 0638   • enoxaparin (LOVENOX) inj 80 mg  80 mg Subcutaneous Q12HRS Agustín Samaniego M.D.   80 mg at 08/24/21 0638   • omeprazole (PRILOSEC) capsule 20 mg  20 mg Oral DAILY Agustín Samaniego M.D.   20 mg at 08/24/21 0639   • sodium chloride (OCEAN) 0.65 % nasal spray 2 Spray  2 Spray Nasal Q2HRS PRN Jose Angel Maza M.D.   2 [place] at 08/21/21 2054   • calcium carbonate (TUMS) chewable tab 500 mg  500 mg Oral TID PRN Brown Moreno M.D.   500 mg at 08/20/21 2123   • menthol (HALLS) lozenge 1 Lozenge  1 Lozenge Oral Q2HRS PRN Michele Guajardo D.O.   1 Lozenge at 08/22/21 0101   • benzonatate (TESSALON) capsule 100 mg  100 mg Oral TID PRN HARRISON KelleyOFrederick   100 mg at 08/21/21 0334   • ondansetron (ZOFRAN) syringe/vial injection 4 mg  4 mg Intravenous Q6HRS PRN Rohini Lr M.D.       • ondansetron (ZOFRAN ODT) dispertab 4 mg  4 mg Oral Q6HRS PRN Rohini Lr M.D.       • acetaminophen (Tylenol) tablet 650 mg  650 mg Oral Q6HRS PRN  Rohini Lr M.D.       • labetalol (NORMODYNE/TRANDATE) injection 10 mg  10 mg Intravenous Q6HRS PRN Rohini Lr M.D.       • hydrALAZINE (APRESOLINE) injection 10 mg  10 mg Intravenous Q6HRS PRN Rohini Lr M.D.       • guaiFENesin ER (MUCINEX) tablet 600 mg  600 mg Oral BID PRN Rhoini Lr M.D.   600 mg at 08/22/21 0101   • dexamethasone (DECADRON) tablet 6 mg  6 mg Oral DAILY Rohini Lr M.D.   6 mg at 08/24/21 0639       Fluids    Intake/Output Summary (Last 24 hours) at 8/24/2021 0844  Last data filed at 8/24/2021 0800  Gross per 24 hour   Intake 400 ml   Output 1725 ml   Net -1325 ml       Laboratory          Recent Labs     08/22/21  0553 08/23/21  0512   SODIUM 134* 133*   POTASSIUM 4.3 4.3   CHLORIDE 102 100   CO2 25 24   BUN 23* 28*   CREATININE 0.80 0.80   CALCIUM 8.6 8.9     Recent Labs     08/22/21  0553 08/23/21  0512   ALTSGPT 129* 99*   ASTSGOT 70* 44   ALKPHOSPHAT 81 78   TBILIRUBIN 0.5 0.6   GLUCOSE 108* 104*     Recent Labs     08/22/21  0553 08/23/21  0512   WBC 8.6 9.1   NEUTSPOLYS 82.10* 80.90*   LYMPHOCYTES 8.40* 8.80*   MONOCYTES 3.90 4.50   EOSINOPHILS 2.00 2.00   BASOPHILS 0.40 0.90   ASTSGOT 70* 44   ALTSGPT 129* 99*   ALKPHOSPHAT 81 78   TBILIRUBIN 0.5 0.6     Recent Labs     08/22/21  0553 08/23/21  0512   RBC 5.37 5.49   HEMOGLOBIN 16.0 16.2   HEMATOCRIT 46.2 48.6   PLATELETCT 316 299     Imaging  B/l infiltrates mainly peripherally cxr on 8/15 personally reviewed, no imaging since    Assessment/Plan    * Acute hypoxemic respiratory failure due to COVID-19 (HCC)- (present on admission)  Assessment & Plan  Needed bipap, transferred to floor 8/17, back to ICU 8/22 maxed on hiflo 100%/60 L and NRB  Decadron 8/15  Tocilimuzab 8/16  Transamininits but improving, remdesivir not ideal  D dimer > 20 -> nosebleeds resolved. 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 In review of yesterday's note (8/23/2021), there are no changes except as documented above.     Discussed patient condition and risk of morbidity and/or mortality with RN, RT, Charge nurse / hot rounds and Patient     The patient remains critically ill.  Critical care time = 38 minutes in directly providing and coordinating critical care and extensive data review.  No time overlap and excludes procedures.    This note was generated using voice recognition software which has a chance of producing errors of grammar and content.  I have made every reasonable attempt to find and correct any errors, but it should be expected that some may not be found prior to finalization of this note.  __________  Agustín Samaniego MD  Pulmonary and Critical Care Medicine  [facility]

## 2021-08-24 NOTE — FLOWSHEET NOTE
08/24/21 1025   Events/Summary/Plan   Events/Summary/Plan Titrated flow to 50 lpm.   Vital Signs   Pulse 60   Respiration (!) 25   Pulse Oximetry 94 %   $ Pulse Oximetry (Spot Check) Yes   Oxygen   O2 (LPM) 60  (titrated to 50 )   FiO2% 60 %   O2 Delivery Device Heated High Flow Nasal Cannula   Aerosols   $ Aerosol Delivery Device Heated High Flow Nasal Cannula   Aerosol Temperature 31 °C (87.8 °F)

## 2021-08-24 NOTE — FLOWSHEET NOTE
08/24/21 0749   Vital Signs   Pulse 63   Respiration (!) 21   Pulse Oximetry 96 %   $ Pulse Oximetry (Spot Check) Yes   Oxygen   O2 (LPM) 60   FiO2% 80 %   O2 Delivery Device Heated High Flow Nasal Cannula

## 2021-08-24 NOTE — FLOWSHEET NOTE
08/24/21 0650   Events/Summary/Plan   Events/Summary/Plan Titrated to 80%    Skin Inspection Respiratory Device Intact   Vital Signs   Pulse 61   Respiration (!) 24   Pulse Oximetry 96 %   $ Pulse Oximetry (Spot Check) Yes   Respiratory Assessment   Level of Consciousness Alert   Oxygen   O2 (LPM) 60   FiO2% 100 %  (titrated to 80%)   O2 Delivery Device Heated High Flow Nasal Cannula   Aerosols   $ Aerosol Delivery Device Heated High Flow Nasal Cannula   Aerosol Temperature 30 °C (86 °F)

## 2021-08-24 NOTE — FLOWSHEET NOTE
08/24/21 1410   Vital Signs   Pulse 66   Respiration (!) 23   Pulse Oximetry 91 %   $ Pulse Oximetry (Spot Check) Yes   Respiratory Assessment   Level of Consciousness Alert   Oxygen   O2 (LPM) 50   FiO2% 60 %   O2 Delivery Device Heated High Flow Nasal Cannula   Aerosols   $ Aerosol Delivery Device Heated High Flow Nasal Cannula   Aerosol Temperature 31 °C (87.8 °F)

## 2021-08-24 NOTE — DISCHARGE PLANNING
Anticipated Discharge Disposition:   TBD, possibly home when medically cleared    Action:   Chart review complete.     1445: Patient remains on HFNC. RN JOSSIE sent a follow up email to [facility] asking for updates on screening for emergency medicaid.     Barriers to Discharge:   Medical Clearance  uninsured    Plan:   Hospital care management will continue to assist with discharge planning needs.

## 2021-08-25 LAB
ALBUMIN SERPL BCP-MCNC: 3.1 G/DL (ref 3.2–4.9)
ALBUMIN/GLOB SERPL: 0.9 G/DL
ALP SERPL-CCNC: 79 U/L (ref 30–99)
ALT SERPL-CCNC: 79 U/L (ref 2–50)
ANION GAP SERPL CALC-SCNC: 11 MMOL/L (ref 7–16)
AST SERPL-CCNC: 33 U/L (ref 12–45)
BASOPHILS # BLD AUTO: 0.5 % (ref 0–1.8)
BASOPHILS # BLD: 0.07 K/UL (ref 0–0.12)
BILIRUB SERPL-MCNC: 0.5 MG/DL (ref 0.1–1.5)
BUN SERPL-MCNC: 29 MG/DL (ref 8–22)
CALCIUM SERPL-MCNC: 9.1 MG/DL (ref 8.4–10.2)
CHLORIDE SERPL-SCNC: 95 MMOL/L (ref 96–112)
CO2 SERPL-SCNC: 27 MMOL/L (ref 20–33)
CREAT SERPL-MCNC: 0.86 MG/DL (ref 0.5–1.4)
EOSINOPHIL # BLD AUTO: 0.16 K/UL (ref 0–0.51)
EOSINOPHIL NFR BLD: 1.2 % (ref 0–6.9)
ERYTHROCYTE [DISTWIDTH] IN BLOOD BY AUTOMATED COUNT: 39.9 FL (ref 35.9–50)
GLOBULIN SER CALC-MCNC: 3.5 G/DL (ref 1.9–3.5)
GLUCOSE SERPL-MCNC: 107 MG/DL (ref 65–99)
HCT VFR BLD AUTO: 47.7 % (ref 42–52)
HGB BLD-MCNC: 16.5 G/DL (ref 14–18)
IMM GRANULOCYTES # BLD AUTO: 0.38 K/UL (ref 0–0.11)
IMM GRANULOCYTES NFR BLD AUTO: 3 % (ref 0–0.9)
LYMPHOCYTES # BLD AUTO: 1.43 K/UL (ref 1–4.8)
LYMPHOCYTES NFR BLD: 11.1 % (ref 22–41)
MAGNESIUM SERPL-MCNC: 2.3 MG/DL (ref 1.5–2.5)
MCH RBC QN AUTO: 29.9 PG (ref 27–33)
MCHC RBC AUTO-ENTMCNC: 34.6 G/DL (ref 33.7–35.3)
MCV RBC AUTO: 86.4 FL (ref 81.4–97.8)
MONOCYTES # BLD AUTO: 0.76 K/UL (ref 0–0.85)
MONOCYTES NFR BLD AUTO: 5.9 % (ref 0–13.4)
NEUTROPHILS # BLD AUTO: 10.05 K/UL (ref 1.82–7.42)
NEUTROPHILS NFR BLD: 78.3 % (ref 44–72)
NRBC # BLD AUTO: 0 K/UL
NRBC BLD-RTO: 0 /100 WBC
PHOSPHATE SERPL-MCNC: 4.7 MG/DL (ref 2.5–4.5)
PLATELET # BLD AUTO: 271 K/UL (ref 164–446)
PMV BLD AUTO: 8.9 FL (ref 9–12.9)
POTASSIUM SERPL-SCNC: 4.2 MMOL/L (ref 3.6–5.5)
PROT SERPL-MCNC: 6.6 G/DL (ref 6–8.2)
RBC # BLD AUTO: 5.52 M/UL (ref 4.7–6.1)
SODIUM SERPL-SCNC: 133 MMOL/L (ref 135–145)
WBC # BLD AUTO: 12.9 K/UL (ref 4.8–10.8)

## 2021-08-25 PROCEDURE — 85025 COMPLETE CBC W/AUTO DIFF WBC: CPT

## 2021-08-25 PROCEDURE — 700102 HCHG RX REV CODE 250 W/ 637 OVERRIDE(OP): Performed by: INTERNAL MEDICINE

## 2021-08-25 PROCEDURE — 700111 HCHG RX REV CODE 636 W/ 250 OVERRIDE (IP): Performed by: INTERNAL MEDICINE

## 2021-08-25 PROCEDURE — 80053 COMPREHEN METABOLIC PANEL: CPT

## 2021-08-25 PROCEDURE — 94640 AIRWAY INHALATION TREATMENT: CPT

## 2021-08-25 PROCEDURE — A9270 NON-COVERED ITEM OR SERVICE: HCPCS | Performed by: INTERNAL MEDICINE

## 2021-08-25 PROCEDURE — 700102 HCHG RX REV CODE 250 W/ 637 OVERRIDE(OP): Performed by: HOSPITALIST

## 2021-08-25 PROCEDURE — 770020 HCHG ROOM/CARE - TELE (206)

## 2021-08-25 PROCEDURE — 84100 ASSAY OF PHOSPHORUS: CPT

## 2021-08-25 PROCEDURE — A9270 NON-COVERED ITEM OR SERVICE: HCPCS | Performed by: HOSPITALIST

## 2021-08-25 PROCEDURE — 94760 N-INVAS EAR/PLS OXIMETRY 1: CPT

## 2021-08-25 PROCEDURE — 99233 SBSQ HOSP IP/OBS HIGH 50: CPT | Performed by: INTERNAL MEDICINE

## 2021-08-25 PROCEDURE — 83735 ASSAY OF MAGNESIUM: CPT

## 2021-08-25 RX ADMIN — FUROSEMIDE 40 MG: 40 INJECTION, SOLUTION INTRAMUSCULAR; INTRAVENOUS at 17:13

## 2021-08-25 RX ADMIN — DEXAMETHASONE 6 MG: 4 TABLET ORAL at 05:52

## 2021-08-25 RX ADMIN — OMEPRAZOLE 20 MG: 20 CAPSULE, DELAYED RELEASE ORAL at 05:52

## 2021-08-25 RX ADMIN — BENZONATATE 100 MG: 100 CAPSULE ORAL at 17:20

## 2021-08-25 RX ADMIN — ENOXAPARIN SODIUM 80 MG: 80 INJECTION SUBCUTANEOUS at 05:52

## 2021-08-25 RX ADMIN — FUROSEMIDE 40 MG: 40 INJECTION, SOLUTION INTRAMUSCULAR; INTRAVENOUS at 05:52

## 2021-08-25 RX ADMIN — ENOXAPARIN SODIUM 80 MG: 80 INJECTION SUBCUTANEOUS at 17:13

## 2021-08-25 RX ADMIN — BENZONATATE 100 MG: 100 CAPSULE ORAL at 04:26

## 2021-08-25 ASSESSMENT — ENCOUNTER SYMPTOMS
WEAKNESS: 0
CONSTIPATION: 0
DIARRHEA: 0
ABDOMINAL PAIN: 0
INSOMNIA: 0
FEVER: 0
MYALGIAS: 0
DEPRESSION: 0
NERVOUS/ANXIOUS: 0
HEADACHES: 0
HEARTBURN: 0
PHOTOPHOBIA: 0
SPEECH CHANGE: 0
BLURRED VISION: 0
VOMITING: 0
CHILLS: 0
SHORTNESS OF BREATH: 1
SORE THROAT: 0
CLAUDICATION: 0
SENSORY CHANGE: 0
DIZZINESS: 0
COUGH: 0

## 2021-08-25 ASSESSMENT — PAIN DESCRIPTION - PAIN TYPE
TYPE: ACUTE PAIN
TYPE: ACUTE PAIN

## 2021-08-25 ASSESSMENT — FIBROSIS 4 INDEX: FIB4 SCORE: 0.86

## 2021-08-25 NOTE — DISCHARGE PLANNING
Anticipated Discharge Disposition:   Home when medically cleared     Action:   Chart review complete.     Patient transitioned off of HFNC and as of 1148 today, patient is on 10 liters via oxymask. Per MD, patient is improving. Patient anticipated to discharge home in few days when medically cleared.     RN CM will continue to follow and assist as needed.     Barriers to Discharge:   Medical Clearance  Possible DME oxygen need    Plan:   Hospital care management will continue to assist with discharge planning needs.

## 2021-08-25 NOTE — FLOWSHEET NOTE
08/25/21 0825   Events/Summary/Plan   Events/Summary/Plan Up eating.   Vital Signs   Pulse 78   Respiration (!) 25   Pulse Oximetry (!) 86 %   $ Pulse Oximetry (Spot Check) Yes   Oxygen   O2 (LPM) 40   FiO2% 45 %   O2 Delivery Device Heated High Flow Nasal Cannula

## 2021-08-25 NOTE — PROGRESS NOTES
Palliative Care follow-up  Pt's completed original DPOA-HC provided to pt's nurse Christal BLACKWELL.  Original to be given to pt at discharge.  Copy scanned into EMR.    Thank you for allowing Palliative Care to support this patient and family. Contact [phone] for additional assistance, change in patient status, or with any questions/concerns.

## 2021-08-25 NOTE — FLOWSHEET NOTE
08/25/21 1350   Vital Signs   Pulse 70   Respiration (!) 24   Pulse Oximetry 92 %   $ Pulse Oximetry (Spot Check) Yes   Respiratory Assessment   Level of Consciousness Alert   Oxygen   O2 (LPM) 10   O2 Delivery Device Oxymask

## 2021-08-25 NOTE — FLOWSHEET NOTE
08/25/21 1045   Events/Summary/Plan   Events/Summary/Plan Titrated to 15 lpm oxymask, then titrated to 10 lpm .   Vital Signs   Pulse 73   Respiration (!) 24   Pulse Oximetry 93 %   $ Pulse Oximetry (Spot Check) Yes   Oxygen   O2 (LPM) 40   FiO2% 45 %   O2 Delivery Device Heated High Flow Nasal Cannula   Aerosols   $ Aerosol Delivery Device Heated High Flow Nasal Cannula   Aerosol Temperature 31 °C (87.8 °F)      Patient upset sts thought he could alternate nectar thick and thin liquids also said family could supervise .  Dorota from Speech talked to patient over phone and told him she would come up to evaluate him and to stay on nectar till does and stated family could supervise him eating.

## 2021-08-25 NOTE — FLOWSHEET NOTE
08/25/21 1148   Vital Signs   Pulse 66   Respiration (!) 24   Pulse Oximetry 93 %   $ Pulse Oximetry (Spot Check) Yes   Oxygen   O2 (LPM) 10   O2 Delivery Device Oxymask

## 2021-08-25 NOTE — PROGRESS NOTES
Hospital Medicine Daily Progress Note    Date of Service  8/25/2021    Chief Complaint  Venkat Mejía is a 56 y.o. male admitted 8/15/2021 with shortness of breath.    Hospital Course  57 yo male transferred from [place] with worsening SOB dx covid 8/8/21 and b/l infiltrates and high o2 requirement. Transitioned to high flow and improved over the course of few days so transferred out of the ICU on 8/17/2021.   Dexamethasone  Being diuresed.  Received tocilimuzab on 8/16  Not a remdesivir candidate.  Downgraded to telemetry status on 8/24      Interval Problem Update  8/25 Patient states he is feeling better and no new complaints.  He is still on High flow 40L/45% fio2.  He is diuresing well with lasix bid.     I have personally seen and examined the patient at bedside. I discussed the plan of care with patient, bedside RN, charge RN,  and pharmacy.    Consultants/Specialty  critical care    Code Status  Full Code    Disposition  Patient is not medically cleared.   Anticipate discharge to to home with close outpatient follow-up.  I have placed the appropriate orders for post-discharge needs.    Review of Systems  Review of Systems   Constitutional: Negative for chills and fever.   HENT: Negative for congestion and sore throat.    Eyes: Negative for blurred vision and photophobia.   Respiratory: Positive for shortness of breath. Negative for cough.    Cardiovascular: Negative for chest pain, claudication and leg swelling.   Gastrointestinal: Negative for abdominal pain, constipation, diarrhea, heartburn and vomiting.   Genitourinary: Negative for dysuria and hematuria.   Musculoskeletal: Negative for joint pain and myalgias.   Skin: Negative for itching and rash.   Neurological: Negative for dizziness, sensory change, speech change, weakness and headaches.   Psychiatric/Behavioral: Negative for depression. The patient is not nervous/anxious and does not have insomnia.         Physical  Exam  Temp:  [36.7 °C (98.1 °F)-37.6 °C (99.7 °F)] 36.7 °C (98.1 °F)  Pulse:  [58-78] 66  Resp:  [6-40] 24  BP: ()/(57-77) 120/68  SpO2:  [86 %-95 %] 93 %    Physical Exam  Vitals and nursing note reviewed.   Constitutional:       General: He is not in acute distress.     Appearance: Normal appearance.   HENT:      Head: Normocephalic and atraumatic.   Eyes:      General: No scleral icterus.     Extraocular Movements: Extraocular movements intact.   Cardiovascular:      Rate and Rhythm: Normal rate and regular rhythm.      Pulses: Normal pulses.      Heart sounds: Normal heart sounds. No murmur heard.     Pulmonary:      Effort: Pulmonary effort is normal. No respiratory distress.      Breath sounds: Normal breath sounds. No wheezing, rhonchi or rales.   Abdominal:      General: Abdomen is flat. Bowel sounds are normal. There is no distension.      Palpations: Abdomen is soft.      Tenderness: There is no rebound.   Musculoskeletal:         General: No swelling or tenderness.      Cervical back: Normal range of motion and neck supple.   Lymphadenopathy:      Cervical: No cervical adenopathy.   Skin:     Coloration: Skin is not jaundiced.      Findings: No erythema.   Neurological:      General: No focal deficit present.      Mental Status: He is alert and oriented to person, place, and time. Mental status is at baseline.      Cranial Nerves: No cranial nerve deficit.   Psychiatric:         Mood and Affect: Mood normal.         Behavior: Behavior normal.         Fluids    Intake/Output Summary (Last 24 hours) at 8/25/2021 1259  Last data filed at 8/25/2021 0800  Gross per 24 hour   Intake 720 ml   Output 1625 ml   Net -905 ml       Laboratory  Recent Labs     08/23/21  0512 08/25/21  0419   WBC 9.1 12.9*   RBC 5.49 5.52   HEMOGLOBIN 16.2 16.5   HEMATOCRIT 48.6 47.7   MCV 88.5 86.4   MCH 29.5 29.9   MCHC 33.3* 34.6   RDW 42.4 39.9   PLATELETCT 299 271   MPV 8.8* 8.9*     Recent Labs     08/23/21 0512  08/25/21  0419   SODIUM 133* 133*   POTASSIUM 4.3 4.2   CHLORIDE 100 95*   CO2 24 27   GLUCOSE 104* 107*   BUN 28* 29*   CREATININE 0.80 0.86   CALCIUM 8.9 9.1                   Imaging  US-EXTREMITY VENOUS LOWER BILAT   Final Result      DX-CHEST-PORTABLE (1 VIEW)   Final Result      1.  Bilateral perihilar and lower lobe interstitial opacity which represent interstitial edema or pneumonia.      2.  No lobar consolidation.              Assessment/Plan  * Acute hypoxemic respiratory failure due to COVID-19 (HCC)- (present on admission)  Assessment & Plan  Patient 1 week ago, on 8/8/2021, started having fevers and chills.  He went to the local emergency room in [place].  There they diagnosed him with COVID-19 infection.  He was sent home on 8/9/2021.  Here he was admitted to the ICU and currently on highflow.   On Decadron  On full dose Lovenox with high D dimer (>20)  Patient is unvaccinated.  Patient currently requires high amounts of oxygen.    Elevated LFTs- (present on admission)  Assessment & Plan  Likely due to COVID 19 infection  Improving  Monitor     Hyponatremia- (present on admission)  Assessment & Plan  Mild  Monitor     Acute respiratory failure with hypoxia (HCC)- (present on admission)  Assessment & Plan  Secondary to bilateral COVID-19 infection  Continue HFNC, wean off as tolerated  Optimize oxygen use to keep oxygen saturations above 90%.         VTE prophylaxis: enoxaparin ppx    I have performed a physical exam and reviewed and updated ROS and Plan today (8/25/2021). In review of yesterday's note (8/24/2021), there are no changes except as documented above.

## 2021-08-25 NOTE — FLOWSHEET NOTE
08/25/21 0750   Events/Summary/Plan   Events/Summary/Plan Titrated to 40lpm, 45%   Vital Signs   Pulse 63   Respiration (!) 21   Pulse Oximetry 94 %   $ Pulse Oximetry (Spot Check) Yes   Respiratory Assessment   Level of Consciousness Alert   Oxygen   O2 (LPM) 50  (titrated to 40)   FiO2% 55 %  (titrated to 45%)   O2 Delivery Device Heated High Flow Nasal Cannula   Aerosols   $ Aerosol Delivery Device Heated High Flow Nasal Cannula   Aerosol Temperature 31 °C (87.8 °F)

## 2021-08-25 NOTE — HOSPITAL COURSE
55 yo male transferred from [place] with worsening SOB dx covid 8/8/21 and b/l infiltrates and high o2 requirement. Transitioned to high flow and improved over the course of few days so transferred out of the ICU on 8/17/2021.   Dexamethasone completed  Diuresis completed  Received tocilimuzab on 8/16  Not a remdesivir candidate.  Downgraded to telemetry status on 8/24

## 2021-08-25 NOTE — FLOWSHEET NOTE
08/25/21 0400   Events/Summary/Plan   Events/Summary/Plan HHFNC check, H20 bag 1/3rd full  (FI02 decreased to 55%)   Skin Inspection Respiratory Device Intact   Respiratory Assessment   Respiratory Pattern Within Normal Limits   Level of Consciousness Alert   Chest Exam   Work Of Breathing / Effort Mild   Breath Sounds   RUL Breath Sounds Diminished   RML Breath Sounds Diminished   RLL Breath Sounds Diminished   ABNER Breath Sounds Diminished   LLL Breath Sounds Diminished   Secretions   Cough Non Productive   Oxygen   O2 (LPM) 50   FiO2% 55 %   O2 Delivery Device Heated High Flow Nasal Cannula   Aerosols   $ Aerosol Delivery Device Heated High Flow Nasal Cannula

## 2021-08-25 NOTE — FLOWSHEET NOTE
08/24/21 2126   Events/Summary/Plan   Events/Summary/Plan HHFNC check, H20 bag 1/2 full pt proning/asleep   Skin Inspection Respiratory Device Intact   Vital Signs   $ Pulse Oximetry (Spot Check) Yes   Respiratory Assessment   Respiratory Pattern Within Normal Limits   Level of Consciousness Alert   Chest Exam   Work Of Breathing / Effort Mild   Breath Sounds   RUL Breath Sounds Diminished   RML Breath Sounds Diminished   RLL Breath Sounds Diminished   ABENR Breath Sounds Diminished   LLL Breath Sounds Diminished   Oxygen   O2 (LPM) 50   FiO2% 60 %   O2 Delivery Device Heated High Flow Nasal Cannula   Resuscitation Device at Bedside Self Inflating Bag   Aerosols   $ Aerosol Delivery Device Heated High Flow Nasal Cannula   Aerosol Temperature 31 °C (87.8 °F)   Equipment Change Date 09/23/21

## 2021-08-25 NOTE — FLOWSHEET NOTE
08/24/21 1845   Events/Summary/Plan   Events/Summary/Plan FNC, 02 check   Skin Inspection Respiratory Device Intact   Vital Signs   Pulse 78   Respiration 14   Pulse Oximetry 94 %   $ Pulse Oximetry (Spot Check) Yes   Respiratory Assessment   Respiratory Pattern Within Normal Limits   Level of Consciousness Alert   Chest Exam   Work Of Breathing / Effort Mild   Breath Sounds   RUL Breath Sounds Diminished   RML Breath Sounds Diminished   RLL Breath Sounds Diminished   ABNER Breath Sounds Diminished   LLL Breath Sounds Diminished   Secretions   Cough Non Productive   Oxygen   O2 (LPM) 50   FiO2% 60 %   O2 Delivery Device Heated High Flow Nasal Cannula   Resuscitation Device at Bedside Self Inflating Bag   Aerosols   $ Aerosol Delivery Device Heated High Flow Nasal Cannula   Aerosol Temperature 31 °C (87.8 °F)   Equipment Change Date 09/23/21

## 2021-08-26 LAB
ANION GAP SERPL CALC-SCNC: 9 MMOL/L (ref 7–16)
BASOPHILS # BLD AUTO: 0.6 % (ref 0–1.8)
BASOPHILS # BLD: 0.07 K/UL (ref 0–0.12)
BUN SERPL-MCNC: 31 MG/DL (ref 8–22)
CALCIUM SERPL-MCNC: 9.3 MG/DL (ref 8.4–10.2)
CHLORIDE SERPL-SCNC: 94 MMOL/L (ref 96–112)
CO2 SERPL-SCNC: 29 MMOL/L (ref 20–33)
CREAT SERPL-MCNC: 0.91 MG/DL (ref 0.5–1.4)
EOSINOPHIL # BLD AUTO: 0.12 K/UL (ref 0–0.51)
EOSINOPHIL NFR BLD: 1 % (ref 0–6.9)
ERYTHROCYTE [DISTWIDTH] IN BLOOD BY AUTOMATED COUNT: 40.1 FL (ref 35.9–50)
GLUCOSE SERPL-MCNC: 99 MG/DL (ref 65–99)
HCT VFR BLD AUTO: 46.6 % (ref 42–52)
HGB BLD-MCNC: 15.9 G/DL (ref 14–18)
IMM GRANULOCYTES # BLD AUTO: 0.32 K/UL (ref 0–0.11)
IMM GRANULOCYTES NFR BLD AUTO: 2.7 % (ref 0–0.9)
LYMPHOCYTES # BLD AUTO: 1.47 K/UL (ref 1–4.8)
LYMPHOCYTES NFR BLD: 12.4 % (ref 22–41)
MAGNESIUM SERPL-MCNC: 2.3 MG/DL (ref 1.5–2.5)
MCH RBC QN AUTO: 29.6 PG (ref 27–33)
MCHC RBC AUTO-ENTMCNC: 34.1 G/DL (ref 33.7–35.3)
MCV RBC AUTO: 86.6 FL (ref 81.4–97.8)
MONOCYTES # BLD AUTO: 0.94 K/UL (ref 0–0.85)
MONOCYTES NFR BLD AUTO: 7.9 % (ref 0–13.4)
NEUTROPHILS # BLD AUTO: 8.96 K/UL (ref 1.82–7.42)
NEUTROPHILS NFR BLD: 75.4 % (ref 44–72)
NRBC # BLD AUTO: 0 K/UL
NRBC BLD-RTO: 0 /100 WBC
PHOSPHATE SERPL-MCNC: 4.6 MG/DL (ref 2.5–4.5)
PLATELET # BLD AUTO: 278 K/UL (ref 164–446)
PMV BLD AUTO: 9.2 FL (ref 9–12.9)
POTASSIUM SERPL-SCNC: 4.1 MMOL/L (ref 3.6–5.5)
RBC # BLD AUTO: 5.38 M/UL (ref 4.7–6.1)
SODIUM SERPL-SCNC: 132 MMOL/L (ref 135–145)
WBC # BLD AUTO: 11.9 K/UL (ref 4.8–10.8)

## 2021-08-26 PROCEDURE — 700102 HCHG RX REV CODE 250 W/ 637 OVERRIDE(OP): Performed by: INTERNAL MEDICINE

## 2021-08-26 PROCEDURE — 83735 ASSAY OF MAGNESIUM: CPT

## 2021-08-26 PROCEDURE — 80048 BASIC METABOLIC PNL TOTAL CA: CPT

## 2021-08-26 PROCEDURE — A9270 NON-COVERED ITEM OR SERVICE: HCPCS | Performed by: INTERNAL MEDICINE

## 2021-08-26 PROCEDURE — 700111 HCHG RX REV CODE 636 W/ 250 OVERRIDE (IP): Performed by: INTERNAL MEDICINE

## 2021-08-26 PROCEDURE — 770020 HCHG ROOM/CARE - TELE (206)

## 2021-08-26 PROCEDURE — 85025 COMPLETE CBC W/AUTO DIFF WBC: CPT

## 2021-08-26 PROCEDURE — 99232 SBSQ HOSP IP/OBS MODERATE 35: CPT | Performed by: INTERNAL MEDICINE

## 2021-08-26 PROCEDURE — 84100 ASSAY OF PHOSPHORUS: CPT

## 2021-08-26 RX ORDER — GUAIFENESIN 600 MG/1
1200 TABLET, EXTENDED RELEASE ORAL EVERY 12 HOURS
Status: DISCONTINUED | OUTPATIENT
Start: 2021-08-26 | End: 2021-08-31 | Stop reason: HOSPADM

## 2021-08-26 RX ORDER — GUAIFENESIN 600 MG/1
600 TABLET, EXTENDED RELEASE ORAL 2 TIMES DAILY
Status: DISCONTINUED | OUTPATIENT
Start: 2021-08-26 | End: 2021-08-26

## 2021-08-26 RX ADMIN — FUROSEMIDE 40 MG: 40 INJECTION, SOLUTION INTRAMUSCULAR; INTRAVENOUS at 06:22

## 2021-08-26 RX ADMIN — GUAIFENESIN 1200 MG: 600 TABLET, EXTENDED RELEASE ORAL at 11:02

## 2021-08-26 RX ADMIN — ENOXAPARIN SODIUM 80 MG: 80 INJECTION SUBCUTANEOUS at 06:22

## 2021-08-26 RX ADMIN — ENOXAPARIN SODIUM 80 MG: 80 INJECTION SUBCUTANEOUS at 17:01

## 2021-08-26 RX ADMIN — FUROSEMIDE 40 MG: 40 INJECTION, SOLUTION INTRAMUSCULAR; INTRAVENOUS at 17:00

## 2021-08-26 RX ADMIN — OMEPRAZOLE 20 MG: 20 CAPSULE, DELAYED RELEASE ORAL at 06:22

## 2021-08-26 RX ADMIN — BENZONATATE 100 MG: 100 CAPSULE ORAL at 20:27

## 2021-08-26 RX ADMIN — BENZONATATE 100 MG: 100 CAPSULE ORAL at 04:24

## 2021-08-26 RX ADMIN — GUAIFENESIN 1200 MG: 600 TABLET, EXTENDED RELEASE ORAL at 16:59

## 2021-08-26 ASSESSMENT — ENCOUNTER SYMPTOMS
SHORTNESS OF BREATH: 1
MYALGIAS: 0
HEADACHES: 0
INSOMNIA: 0
SPEECH CHANGE: 0
DEPRESSION: 0
PHOTOPHOBIA: 0
SENSORY CHANGE: 0
COUGH: 0
VOMITING: 0
CONSTIPATION: 0
CHILLS: 0
ABDOMINAL PAIN: 0
DIZZINESS: 0
HEARTBURN: 0
CLAUDICATION: 0
NERVOUS/ANXIOUS: 0
DIARRHEA: 0
FEVER: 0
SORE THROAT: 0
BLURRED VISION: 0
WEAKNESS: 0

## 2021-08-26 ASSESSMENT — FIBROSIS 4 INDEX: FIB4 SCORE: 0.77

## 2021-08-26 ASSESSMENT — PAIN DESCRIPTION - PAIN TYPE: TYPE: ACUTE PAIN

## 2021-08-26 NOTE — PROGRESS NOTES
Telemetry Shift Summary    Rhythm SB/SR  HR Range 54-66  Ectopy Rare PVC  Measurements 0.14/0.10/0.38        Normal Values  Rhythm SR  HR Range    Measurements 0.12-0.20 / 0.06-0.10  / 0.30-0.52

## 2021-08-26 NOTE — PROGRESS NOTES
Hospital Medicine Daily Progress Note    Date of Service  8/26/2021    Chief Complaint  Venkat Mejía is a 56 y.o. male admitted 8/15/2021 with shortness of breath.    Hospital Course  57 yo male transferred from [place] with worsening SOB dx covid 8/8/21 and b/l infiltrates and high o2 requirement. Transitioned to high flow and improved over the course of few days so transferred out of the ICU on 8/17/2021.   Dexamethasone  Being diuresed.  Received tocilimuzab on 8/16  Not a remdesivir candidate.  Downgraded to telemetry status on 8/24      Interval Problem Update  8/25 Patient states he is feeling better and no new complaints.  He is still on High flow 40L/45% fio2.  He is diuresing well with lasix bid.   8/26 Patient weaned from high flow down to 10L oxymask.  I will add scheduled mucinex as he is seemingly coughing slightly more today.    I have personally seen and examined the patient at bedside. I discussed the plan of care with patient, bedside RN, charge RN,  and pharmacy.    Consultants/Specialty  critical care    Code Status  Full Code    Disposition  Patient is not medically cleared.   Anticipate discharge to to home with close outpatient follow-up.  I have placed the appropriate orders for post-discharge needs.    Review of Systems  Review of Systems   Constitutional: Negative for chills and fever.   HENT: Negative for congestion and sore throat.    Eyes: Negative for blurred vision and photophobia.   Respiratory: Positive for shortness of breath. Negative for cough.    Cardiovascular: Negative for chest pain, claudication and leg swelling.   Gastrointestinal: Negative for abdominal pain, constipation, diarrhea, heartburn and vomiting.   Genitourinary: Negative for dysuria and hematuria.   Musculoskeletal: Negative for joint pain and myalgias.   Skin: Negative for itching and rash.   Neurological: Negative for dizziness, sensory change, speech change, weakness and headaches.    Psychiatric/Behavioral: Negative for depression. The patient is not nervous/anxious and does not have insomnia.         Physical Exam  Temp:  [36.7 °C (98 °F)-37.5 °C (99.5 °F)] 36.9 °C (98.4 °F)  Pulse:  [57-91] 91  Resp:  [20-24] 20  BP: ()/(59-79) 96/76  SpO2:  [92 %-98 %] 97 %    Physical Exam  Vitals and nursing note reviewed.   Constitutional:       General: He is not in acute distress.     Appearance: Normal appearance.   HENT:      Head: Normocephalic and atraumatic.   Eyes:      General: No scleral icterus.     Extraocular Movements: Extraocular movements intact.   Cardiovascular:      Rate and Rhythm: Normal rate and regular rhythm.      Pulses: Normal pulses.      Heart sounds: Normal heart sounds. No murmur heard.     Pulmonary:      Effort: Pulmonary effort is normal. No respiratory distress.      Breath sounds: Normal breath sounds. No wheezing, rhonchi or rales.   Abdominal:      General: Abdomen is flat. Bowel sounds are normal. There is no distension.      Palpations: Abdomen is soft.      Tenderness: There is no rebound.   Musculoskeletal:         General: No swelling or tenderness.      Cervical back: Normal range of motion and neck supple.   Lymphadenopathy:      Cervical: No cervical adenopathy.   Skin:     Coloration: Skin is not jaundiced.      Findings: No erythema.   Neurological:      General: No focal deficit present.      Mental Status: He is alert and oriented to person, place, and time. Mental status is at baseline.      Cranial Nerves: No cranial nerve deficit.   Psychiatric:         Mood and Affect: Mood normal.         Behavior: Behavior normal.         Fluids    Intake/Output Summary (Last 24 hours) at 8/26/2021 1258  Last data filed at 8/26/2021 0800  Gross per 24 hour   Intake --   Output 1100 ml   Net -1100 ml       Laboratory  Recent Labs     08/25/21  0419 08/26/21  0325   WBC 12.9* 11.9*   RBC 5.52 5.38   HEMOGLOBIN 16.5 15.9   HEMATOCRIT 47.7 46.6   MCV 86.4 86.6    MCH 29.9 29.6   MCHC 34.6 34.1   RDW 39.9 40.1   PLATELETCT 271 278   MPV 8.9* 9.2     Recent Labs     08/25/21  0419 08/26/21  0325   SODIUM 133* 132*   POTASSIUM 4.2 4.1   CHLORIDE 95* 94*   CO2 27 29   GLUCOSE 107* 99   BUN 29* 31*   CREATININE 0.86 0.91   CALCIUM 9.1 9.3                   Imaging  US-EXTREMITY VENOUS LOWER BILAT   Final Result      DX-CHEST-PORTABLE (1 VIEW)   Final Result      1.  Bilateral perihilar and lower lobe interstitial opacity which represent interstitial edema or pneumonia.      2.  No lobar consolidation.              Assessment/Plan  * Acute hypoxemic respiratory failure due to COVID-19 (HCC)- (present on admission)  Assessment & Plan  Patient 1 week ago, on 8/8/2021, started having fevers and chills.  He went to the local emergency room in [place].  There they diagnosed him with COVID-19 infection.  He was sent home on 8/9/2021.  Here he was admitted to the ICU and currently on highflow.   On Decadron  On full dose Lovenox with high D dimer (>20)  Patient is unvaccinated.  Patient currently requires high amounts of oxygen.    Elevated LFTs- (present on admission)  Assessment & Plan  Likely due to COVID 19 infection  Improving  Monitor     Hyponatremia- (present on admission)  Assessment & Plan  Mild  Monitor     Acute respiratory failure with hypoxia (HCC)- (present on admission)  Assessment & Plan  Secondary to bilateral COVID-19 infection  Continue HFNC, wean off as tolerated  Optimize oxygen use to keep oxygen saturations above 90%.       VTE prophylaxis: enoxaparin ppx    I have performed a physical exam and reviewed and updated ROS and Plan today (8/26/2021). In review of yesterday's note (8/25/2021), there are no changes except as documented above.

## 2021-08-26 NOTE — PROGRESS NOTES
Report received from ROSALVA Rushing. Plan of care discussed at patient's doorway due to Enhanced Droplet precautions. Pt is resting comfortably in bed and declines any further needs at this time. Safety precautions in place and call light within reach.

## 2021-08-26 NOTE — PROGRESS NOTES
Telemetry Shift Summary      Rhythm: SR   HR Range: 65-76  Ectopy: R PVC  Measurements: .18/.10/.38    Normal Values:  Rhythm: SR  HR Range:   Measurements: 0.12-0.20/ 0.06-0.10/ 0.30-0.52;LE

## 2021-08-27 LAB
ALBUMIN SERPL BCP-MCNC: 3.2 G/DL (ref 3.2–4.9)
ALBUMIN/GLOB SERPL: 0.9 G/DL
ALP SERPL-CCNC: 74 U/L (ref 30–99)
ALT SERPL-CCNC: 86 U/L (ref 2–50)
ANION GAP SERPL CALC-SCNC: 13 MMOL/L (ref 7–16)
AST SERPL-CCNC: 44 U/L (ref 12–45)
BASOPHILS # BLD AUTO: 0.9 % (ref 0–1.8)
BASOPHILS # BLD: 0.09 K/UL (ref 0–0.12)
BILIRUB SERPL-MCNC: 0.5 MG/DL (ref 0.1–1.5)
BUN SERPL-MCNC: 35 MG/DL (ref 8–22)
CALCIUM SERPL-MCNC: 9.2 MG/DL (ref 8.4–10.2)
CHLORIDE SERPL-SCNC: 94 MMOL/L (ref 96–112)
CO2 SERPL-SCNC: 27 MMOL/L (ref 20–33)
CREAT SERPL-MCNC: 0.85 MG/DL (ref 0.5–1.4)
CRP SERPL HS-MCNC: 0.56 MG/DL (ref 0–0.75)
D DIMER PPP IA.FEU-MCNC: 3.81 UG/ML (FEU) (ref 0–0.5)
EOSINOPHIL # BLD AUTO: 0.2 K/UL (ref 0–0.51)
EOSINOPHIL NFR BLD: 2 % (ref 0–6.9)
ERYTHROCYTE [DISTWIDTH] IN BLOOD BY AUTOMATED COUNT: 40.8 FL (ref 35.9–50)
GLOBULIN SER CALC-MCNC: 3.4 G/DL (ref 1.9–3.5)
GLUCOSE SERPL-MCNC: 104 MG/DL (ref 65–99)
HCT VFR BLD AUTO: 47.6 % (ref 42–52)
HGB BLD-MCNC: 16.2 G/DL (ref 14–18)
IMM GRANULOCYTES # BLD AUTO: 0.32 K/UL (ref 0–0.11)
IMM GRANULOCYTES NFR BLD AUTO: 3.2 % (ref 0–0.9)
LYMPHOCYTES # BLD AUTO: 1.41 K/UL (ref 1–4.8)
LYMPHOCYTES NFR BLD: 14 % (ref 22–41)
MCH RBC QN AUTO: 29.7 PG (ref 27–33)
MCHC RBC AUTO-ENTMCNC: 34 G/DL (ref 33.7–35.3)
MCV RBC AUTO: 87.3 FL (ref 81.4–97.8)
MONOCYTES # BLD AUTO: 0.91 K/UL (ref 0–0.85)
MONOCYTES NFR BLD AUTO: 9 % (ref 0–13.4)
NEUTROPHILS # BLD AUTO: 7.13 K/UL (ref 1.82–7.42)
NEUTROPHILS NFR BLD: 70.9 % (ref 44–72)
NRBC # BLD AUTO: 0 K/UL
NRBC BLD-RTO: 0 /100 WBC
PLATELET # BLD AUTO: 267 K/UL (ref 164–446)
PMV BLD AUTO: 9.6 FL (ref 9–12.9)
POTASSIUM SERPL-SCNC: 4.3 MMOL/L (ref 3.6–5.5)
PROCALCITONIN SERPL-MCNC: 0.07 NG/ML
PROT SERPL-MCNC: 6.6 G/DL (ref 6–8.2)
RBC # BLD AUTO: 5.45 M/UL (ref 4.7–6.1)
SODIUM SERPL-SCNC: 134 MMOL/L (ref 135–145)
WBC # BLD AUTO: 10.1 K/UL (ref 4.8–10.8)

## 2021-08-27 PROCEDURE — A9270 NON-COVERED ITEM OR SERVICE: HCPCS | Performed by: INTERNAL MEDICINE

## 2021-08-27 PROCEDURE — 700102 HCHG RX REV CODE 250 W/ 637 OVERRIDE(OP): Performed by: INTERNAL MEDICINE

## 2021-08-27 PROCEDURE — 85025 COMPLETE CBC W/AUTO DIFF WBC: CPT

## 2021-08-27 PROCEDURE — 700111 HCHG RX REV CODE 636 W/ 250 OVERRIDE (IP): Performed by: INTERNAL MEDICINE

## 2021-08-27 PROCEDURE — 84145 PROCALCITONIN (PCT): CPT

## 2021-08-27 PROCEDURE — 80053 COMPREHEN METABOLIC PANEL: CPT

## 2021-08-27 PROCEDURE — 770020 HCHG ROOM/CARE - TELE (206)

## 2021-08-27 PROCEDURE — 99232 SBSQ HOSP IP/OBS MODERATE 35: CPT | Performed by: INTERNAL MEDICINE

## 2021-08-27 PROCEDURE — 85379 FIBRIN DEGRADATION QUANT: CPT

## 2021-08-27 PROCEDURE — 86140 C-REACTIVE PROTEIN: CPT

## 2021-08-27 RX ADMIN — OMEPRAZOLE 20 MG: 20 CAPSULE, DELAYED RELEASE ORAL at 06:14

## 2021-08-27 RX ADMIN — ENOXAPARIN SODIUM 80 MG: 80 INJECTION SUBCUTANEOUS at 06:14

## 2021-08-27 RX ADMIN — FUROSEMIDE 40 MG: 40 INJECTION, SOLUTION INTRAMUSCULAR; INTRAVENOUS at 06:14

## 2021-08-27 RX ADMIN — FUROSEMIDE 40 MG: 40 INJECTION, SOLUTION INTRAMUSCULAR; INTRAVENOUS at 17:11

## 2021-08-27 RX ADMIN — BENZONATATE 100 MG: 100 CAPSULE ORAL at 19:21

## 2021-08-27 RX ADMIN — GUAIFENESIN 1200 MG: 600 TABLET, EXTENDED RELEASE ORAL at 17:11

## 2021-08-27 RX ADMIN — GUAIFENESIN 1200 MG: 600 TABLET, EXTENDED RELEASE ORAL at 06:14

## 2021-08-27 ASSESSMENT — ENCOUNTER SYMPTOMS
CONSTIPATION: 0
INSOMNIA: 0
FEVER: 0
DEPRESSION: 0
COUGH: 0
CLAUDICATION: 0
ABDOMINAL PAIN: 0
SENSORY CHANGE: 0
SPEECH CHANGE: 0
SORE THROAT: 0
HEADACHES: 0
NERVOUS/ANXIOUS: 0
DIARRHEA: 0
PHOTOPHOBIA: 0
MYALGIAS: 0
VOMITING: 0
BLURRED VISION: 0
WEAKNESS: 0
HEARTBURN: 0
CHILLS: 0
DIZZINESS: 0
SHORTNESS OF BREATH: 1

## 2021-08-27 ASSESSMENT — COGNITIVE AND FUNCTIONAL STATUS - GENERAL
SUGGESTED CMS G CODE MODIFIER MOBILITY: CK
EATING MEALS: A LITTLE
STANDING UP FROM CHAIR USING ARMS: A LITTLE
SUGGESTED CMS G CODE MODIFIER DAILY ACTIVITY: CK
MOVING TO AND FROM BED TO CHAIR: A LITTLE
CLIMB 3 TO 5 STEPS WITH RAILING: A LITTLE
TURNING FROM BACK TO SIDE WHILE IN FLAT BAD: A LITTLE
DRESSING REGULAR LOWER BODY CLOTHING: A LITTLE
PERSONAL GROOMING: A LITTLE
DAILY ACTIVITIY SCORE: 18
MOVING FROM LYING ON BACK TO SITTING ON SIDE OF FLAT BED: A LITTLE
MOBILITY SCORE: 18
WALKING IN HOSPITAL ROOM: A LITTLE
TOILETING: A LITTLE
DRESSING REGULAR UPPER BODY CLOTHING: A LITTLE
HELP NEEDED FOR BATHING: A LITTLE

## 2021-08-27 ASSESSMENT — FIBROSIS 4 INDEX: FIB4 SCORE: 1.03

## 2021-08-27 ASSESSMENT — PAIN DESCRIPTION - PAIN TYPE: TYPE: ACUTE PAIN

## 2021-08-27 NOTE — DISCHARGE PLANNING
Anticipated Discharge Disposition:   Home when medically cleared possibly with out of pocket DME oxygen     Action:   Chart review complete.     Discussed patient's plan of care and plans for discharge during rounds. Per MD, this patient is now on 7 liters of oxygen via oxymask. MD anticipates medical clearance in a few days.     If oxygen is needed, patient will need to pay out of pocket due to being uninsured.     RN CM will continue to follow.     Barriers to Discharge:   Medical Clearance    Plan:   Hospital care management will continue to assist with discharge planning needs.

## 2021-08-27 NOTE — PROGRESS NOTES
Telemetry Shift Summary    Rhythm SR w/ BBB  HR Range 50s-60s  Ectopy occ PVCs  Measurements 0.18/0.12/0.38        Normal Values  Rhythm SR  HR Range    Measurements 0.12-0.20 / 0.06-0.10  / 0.30-0.52

## 2021-08-27 NOTE — PROGRESS NOTES
Telemetry Shift Summary    Rhythm: SR/SB  HR: 50-70  Ectopy: R-PVC    Measurements for strip printed 8/27/2021 at 1353  HR 65  0.18 / 010. / 0.38    Normal Values  Rhythm: SR  HR:   Measurements: 0.12-0.20 / 0.06-0.10 / 0.30-0.52

## 2021-08-27 NOTE — PROGRESS NOTES
[facility] Medicine Daily Progress Note    Date of Service  8/27/2021    Chief Complaint  Venkat Mejía is a 56 y.o. male admitted 8/15/2021 with shortness of breath.    Hospital Course  55 yo male transferred from [place] with worsening SOB dx covid 8/8/21 and b/l infiltrates and high o2 requirement. Transitioned to high flow and improved over the course of few days so transferred out of the ICU on 8/17/2021.   Dexamethasone  Being diuresed.  Received tocilimuzab on 8/16  Not a remdesivir candidate.  Downgraded to telemetry status on 8/24      Interval Problem Update  8/25 Patient states he is feeling better and no new complaints.  He is still on High flow 40L/45% fio2.  He is diuresing well with lasix bid.   8/26 Patient weaned from high flow down to 10L oxymask.  I will add scheduled mucinex as he is seemingly coughing slightly more today.  8/27 Patient continuing to show improvement, he denies complaint and states he is improving.  Oxygen needs at 7L today.  He completed decadron treatment on 8/25.  D dimer down to 3.81 from >20, will change lovenox dosage to DVT prophylaxis dose from full treatment dose.    I have personally seen and examined the patient at bedside. I discussed the plan of care with patient, bedside RN, charge RN,  and pharmacy.    Consultants/Specialty  critical care    Code Status  Full Code    Disposition  Patient is not medically cleared.   Anticipate discharge to to home with close outpatient follow-up.  I have placed the appropriate orders for post-discharge needs.    Review of Systems  Review of Systems   Constitutional: Negative for chills and fever.   HENT: Negative for congestion and sore throat.    Eyes: Negative for blurred vision and photophobia.   Respiratory: Positive for shortness of breath (better). Negative for cough.    Cardiovascular: Negative for chest pain, claudication and leg swelling.   Gastrointestinal: Negative for abdominal pain, constipation,  diarrhea, heartburn and vomiting.   Genitourinary: Negative for dysuria and hematuria.   Musculoskeletal: Negative for joint pain and myalgias.   Skin: Negative for itching and rash.   Neurological: Negative for dizziness, sensory change, speech change, weakness and headaches.   Psychiatric/Behavioral: Negative for depression. The patient is not nervous/anxious and does not have insomnia.         Physical Exam  Temp:  [36.5 °C (97.7 °F)-37 °C (98.6 °F)] 36.7 °C (98 °F)  Pulse:  [58-62] 62  Resp:  [16-20] 16  BP: ()/(53-71) 116/64  SpO2:  [91 %-96 %] 95 %    Physical Exam  Vitals and nursing note reviewed.   Constitutional:       General: He is not in acute distress.     Appearance: Normal appearance.   HENT:      Head: Normocephalic and atraumatic.   Eyes:      General: No scleral icterus.     Extraocular Movements: Extraocular movements intact.   Cardiovascular:      Rate and Rhythm: Normal rate and regular rhythm.      Pulses: Normal pulses.      Heart sounds: Normal heart sounds. No murmur heard.     Pulmonary:      Effort: Pulmonary effort is normal. No respiratory distress.      Breath sounds: No wheezing, rhonchi or rales.      Comments: Decreased BS at bases.    Abdominal:      General: Abdomen is flat. Bowel sounds are normal. There is no distension.      Palpations: Abdomen is soft.      Tenderness: There is no rebound.   Musculoskeletal:         General: No swelling or tenderness.      Cervical back: Normal range of motion and neck supple.   Lymphadenopathy:      Cervical: No cervical adenopathy.   Skin:     Coloration: Skin is not jaundiced.      Findings: No erythema.   Neurological:      General: No focal deficit present.      Mental Status: He is alert and oriented to person, place, and time. Mental status is at baseline.      Cranial Nerves: No cranial nerve deficit.   Psychiatric:         Mood and Affect: Mood normal.         Behavior: Behavior normal.         Fluids    Intake/Output Summary  (Last 24 hours) at 8/27/2021 1417  Last data filed at 8/27/2021 0217  Gross per 24 hour   Intake --   Output 775 ml   Net -775 ml       Laboratory  Recent Labs     08/25/21 0419 08/26/21 0325 08/27/21  0213   WBC 12.9* 11.9* 10.1   RBC 5.52 5.38 5.45   HEMOGLOBIN 16.5 15.9 16.2   HEMATOCRIT 47.7 46.6 47.6   MCV 86.4 86.6 87.3   MCH 29.9 29.6 29.7   MCHC 34.6 34.1 34.0   RDW 39.9 40.1 40.8   PLATELETCT 271 278 267   MPV 8.9* 9.2 9.6     Recent Labs     08/25/21 0419 08/26/21 0325 08/27/21 0213   SODIUM 133* 132* 134*   POTASSIUM 4.2 4.1 4.3   CHLORIDE 95* 94* 94*   CO2 27 29 27   GLUCOSE 107* 99 104*   BUN 29* 31* 35*   CREATININE 0.86 0.91 0.85   CALCIUM 9.1 9.3 9.2                   Imaging  US-EXTREMITY VENOUS LOWER BILAT   Final Result      DX-CHEST-PORTABLE (1 VIEW)   Final Result      1.  Bilateral perihilar and lower lobe interstitial opacity which represent interstitial edema or pneumonia.      2.  No lobar consolidation.              Assessment/Plan  * Acute hypoxemic respiratory failure due to COVID-19 (HCC)- (present on admission)  Assessment & Plan  Patient 1 week ago, on 8/8/2021, started having fevers and chills.  He went to the local emergency room in [place].  There they diagnosed him with COVID-19 infection.  He was sent home on 8/9/2021.  Here he was admitted to the ICU and currently on highflow.   On Decadron  On full dose Lovenox with high D dimer (>20)  Patient is unvaccinated.  Patient currently requires high amounts of oxygen.    Elevated LFTs- (present on admission)  Assessment & Plan  Likely due to COVID 19 infection  Improving  Monitor     Hyponatremia- (present on admission)  Assessment & Plan  Mild  Monitor     Acute respiratory failure with hypoxia (HCC)- (present on admission)  Assessment & Plan  Secondary to bilateral COVID-19 infection  Weaned down to oxymask.  Optimize oxygen use to keep oxygen saturations above 90%.       VTE prophylaxis: enoxaparin ppx    I have  performed a physical exam and reviewed and updated ROS and Plan today (8/27/2021). In review of yesterday's note (8/26/2021), there are no changes except as documented above.

## 2021-08-27 NOTE — PROGRESS NOTES
Report received from ROSALVA Rushing. Plan of care discussed. Patient resting comfortably in bed, declines any further needs at this time. Safety precautions in place.

## 2021-08-27 NOTE — CARE PLAN
The patient is Watcher - Medium risk of patient condition declining or worsening    Shift Goals  Clinical Goals: prone  Patient Goals: control pain from cough    Progress made toward(s) clinical / shift goals:  titrate O2 as deemed appropriate; continuous pulse ox on; proning; encouraging IS use; discussed PRNs for cough      Problem: Respiratory  Goal: Patient will achieve/maintain optimum respiratory ventilation and gas exchange  Outcome: Progressing  Note: Continuous pulse ox on; pt prones and practices IS     Problem: Self Care  Goal: Patient will have the ability to perform ADLs independently or with assistance (bathe, groom, dress, toilet and feed)  Outcome: Progressing  Note: Pt encouraged to remain independent in ADLs and to use call light if assistance is needed

## 2021-08-28 LAB
ANION GAP SERPL CALC-SCNC: 14 MMOL/L (ref 7–16)
BUN SERPL-MCNC: 32 MG/DL (ref 8–22)
CALCIUM SERPL-MCNC: 9.2 MG/DL (ref 8.4–10.2)
CHLORIDE SERPL-SCNC: 96 MMOL/L (ref 96–112)
CO2 SERPL-SCNC: 26 MMOL/L (ref 20–33)
CREAT SERPL-MCNC: 0.87 MG/DL (ref 0.5–1.4)
ERYTHROCYTE [DISTWIDTH] IN BLOOD BY AUTOMATED COUNT: 40 FL (ref 35.9–50)
GLUCOSE SERPL-MCNC: 111 MG/DL (ref 65–99)
HCT VFR BLD AUTO: 47.1 % (ref 42–52)
HGB BLD-MCNC: 16.3 G/DL (ref 14–18)
MCH RBC QN AUTO: 29.9 PG (ref 27–33)
MCHC RBC AUTO-ENTMCNC: 34.6 G/DL (ref 33.7–35.3)
MCV RBC AUTO: 86.4 FL (ref 81.4–97.8)
PLATELET # BLD AUTO: 248 K/UL (ref 164–446)
PMV BLD AUTO: 9 FL (ref 9–12.9)
POTASSIUM SERPL-SCNC: 4.2 MMOL/L (ref 3.6–5.5)
RBC # BLD AUTO: 5.45 M/UL (ref 4.7–6.1)
SODIUM SERPL-SCNC: 136 MMOL/L (ref 135–145)
WBC # BLD AUTO: 8.6 K/UL (ref 4.8–10.8)

## 2021-08-28 PROCEDURE — 85027 COMPLETE CBC AUTOMATED: CPT

## 2021-08-28 PROCEDURE — 99232 SBSQ HOSP IP/OBS MODERATE 35: CPT | Performed by: INTERNAL MEDICINE

## 2021-08-28 PROCEDURE — 700102 HCHG RX REV CODE 250 W/ 637 OVERRIDE(OP): Performed by: INTERNAL MEDICINE

## 2021-08-28 PROCEDURE — A9270 NON-COVERED ITEM OR SERVICE: HCPCS | Performed by: INTERNAL MEDICINE

## 2021-08-28 PROCEDURE — 700111 HCHG RX REV CODE 636 W/ 250 OVERRIDE (IP): Performed by: INTERNAL MEDICINE

## 2021-08-28 PROCEDURE — 770020 HCHG ROOM/CARE - TELE (206)

## 2021-08-28 PROCEDURE — 80048 BASIC METABOLIC PNL TOTAL CA: CPT

## 2021-08-28 PROCEDURE — 94760 N-INVAS EAR/PLS OXIMETRY 1: CPT

## 2021-08-28 RX ORDER — FUROSEMIDE 10 MG/ML
40 INJECTION INTRAMUSCULAR; INTRAVENOUS
Status: DISCONTINUED | OUTPATIENT
Start: 2021-08-29 | End: 2021-08-30

## 2021-08-28 RX ADMIN — FUROSEMIDE 40 MG: 40 INJECTION, SOLUTION INTRAMUSCULAR; INTRAVENOUS at 06:04

## 2021-08-28 RX ADMIN — GUAIFENESIN 1200 MG: 600 TABLET, EXTENDED RELEASE ORAL at 17:23

## 2021-08-28 RX ADMIN — GUAIFENESIN 1200 MG: 600 TABLET, EXTENDED RELEASE ORAL at 06:04

## 2021-08-28 RX ADMIN — OMEPRAZOLE 20 MG: 20 CAPSULE, DELAYED RELEASE ORAL at 06:04

## 2021-08-28 RX ADMIN — ENOXAPARIN SODIUM 40 MG: 40 INJECTION SUBCUTANEOUS at 06:03

## 2021-08-28 ASSESSMENT — ENCOUNTER SYMPTOMS
CHILLS: 0
SORE THROAT: 0
FEVER: 0
INSOMNIA: 0
HEADACHES: 0
COUGH: 0
PHOTOPHOBIA: 0
CLAUDICATION: 0
VOMITING: 0
SPEECH CHANGE: 0
SHORTNESS OF BREATH: 1
ABDOMINAL PAIN: 0
DIZZINESS: 0
DIARRHEA: 0
DEPRESSION: 0
CONSTIPATION: 0
HEARTBURN: 0
BLURRED VISION: 0
SENSORY CHANGE: 0
WEAKNESS: 0
NERVOUS/ANXIOUS: 0
MYALGIAS: 0

## 2021-08-28 ASSESSMENT — PAIN DESCRIPTION - PAIN TYPE
TYPE: ACUTE PAIN
TYPE: ACUTE PAIN

## 2021-08-28 NOTE — PROGRESS NOTES
Report received from ROSALVA Becker. Plan of care discussed. Patient resting comfortably in bed, declines any further needs at this time. Safety precautions in place.

## 2021-08-28 NOTE — PROGRESS NOTES
Telemetry Shift Summary    Rhythm SR w/ BBB  HR Range 60s-80s  Ectopy rare PVCs  Measurements 0.20/0.14/0.40        Normal Values  Rhythm SR  HR Range    Measurements 0.12-0.20 / 0.06-0.10  / 0.30-0.52

## 2021-08-28 NOTE — PROGRESS NOTES
Telemetry Shift Summary     RHYTHM: SR  HR RANGE: 64-80  ECTOPY: r PVC  MEASUREMENTS: 0.16/0.12/0.36    Normal Measurements  Rhythm: SR  HR RANGE:   Measurements: 0.12-0.20/0.06-0.10/0.30-0.52      Tele strips reviewed and placed in chart

## 2021-08-28 NOTE — PROGRESS NOTES
Hospital Medicine Daily Progress Note    Date of Service  8/28/2021    Chief Complaint  Venkat Mejía is a 56 y.o. male admitted 8/15/2021 with shortness of breath.    Hospital Course  55 yo male transferred from [place] with worsening SOB dx covid 8/8/21 and b/l infiltrates and high o2 requirement. Transitioned to high flow and improved over the course of few days so transferred out of the ICU on 8/17/2021.   Dexamethasone  Being diuresed.  Received tocilimuzab on 8/16  Not a remdesivir candidate.  Downgraded to telemetry status on 8/24      Interval Problem Update  8/25 Patient states he is feeling better and no new complaints.  He is still on High flow 40L/45% fio2.  He is diuresing well with lasix bid.   8/26 Patient weaned from high flow down to 10L oxymask.  I will add scheduled mucinex as he is seemingly coughing slightly more today.  8/27 Patient continuing to show improvement, he denies complaint and states he is improving.  Oxygen needs at 7L today.  He completed decadron treatment on 8/25.  D dimer down to 3.81 from >20, will change lovenox dosage to DVT prophylaxis dose from full treatment dose.  8/28 Patient feeling well today, needing slightly less oxygen 6-7L.  I'm cutting back diuresis to once daily to make sure it does not adversely affect his oxygen needs.  Once he is on 4L or less, anticipate dc home to [place].    I have personally seen and examined the patient at bedside. I discussed the plan of care with patient, bedside RN, charge RN,  and pharmacy.    Consultants/Specialty  critical care    Code Status  Full Code    Disposition  Patient is not medically cleared.   Anticipate discharge to to home with close outpatient follow-up.  I have placed the appropriate orders for post-discharge needs.    Review of Systems  Review of Systems   Constitutional: Negative for chills and fever.   HENT: Negative for congestion and sore throat.    Eyes: Negative for blurred  vision and photophobia.   Respiratory: Positive for shortness of breath (better). Negative for cough.    Cardiovascular: Negative for chest pain, claudication and leg swelling.   Gastrointestinal: Negative for abdominal pain, constipation, diarrhea, heartburn and vomiting.   Genitourinary: Negative for dysuria and hematuria.   Musculoskeletal: Negative for joint pain and myalgias.   Skin: Negative for itching and rash.   Neurological: Negative for dizziness, sensory change, speech change, weakness and headaches.   Psychiatric/Behavioral: Negative for depression. The patient is not nervous/anxious and does not have insomnia.         Physical Exam  Temp:  [36.3 °C (97.3 °F)-36.8 °C (98.2 °F)] 36.7 °C (98 °F)  Pulse:  [63-86] 75  Resp:  [18-24] 20  BP: ()/(44-70) 108/63  SpO2:  [90 %-97 %] 97 %    Physical Exam  Vitals and nursing note reviewed.   Constitutional:       General: He is not in acute distress.     Appearance: Normal appearance.   HENT:      Head: Normocephalic and atraumatic.   Eyes:      General: No scleral icterus.     Extraocular Movements: Extraocular movements intact.   Cardiovascular:      Rate and Rhythm: Normal rate and regular rhythm.      Pulses: Normal pulses.      Heart sounds: Normal heart sounds. No murmur heard.     Pulmonary:      Effort: Pulmonary effort is normal. No respiratory distress.      Breath sounds: No wheezing, rhonchi or rales.      Comments: Decreased BS at bases.    Abdominal:      General: Abdomen is flat. Bowel sounds are normal. There is no distension.      Palpations: Abdomen is soft.      Tenderness: There is no rebound.   Musculoskeletal:         General: No swelling or tenderness.      Cervical back: Normal range of motion and neck supple.   Lymphadenopathy:      Cervical: No cervical adenopathy.   Skin:     Coloration: Skin is not jaundiced.      Findings: No erythema.   Neurological:      General: No focal deficit present.      Mental Status: He is alert and  oriented to person, place, and time. Mental status is at baseline.      Cranial Nerves: No cranial nerve deficit.   Psychiatric:         Mood and Affect: Mood normal.         Behavior: Behavior normal.         Fluids    Intake/Output Summary (Last 24 hours) at 8/28/2021 1203  Last data filed at 8/28/2021 1109  Gross per 24 hour   Intake --   Output 625 ml   Net -625 ml       Laboratory  Recent Labs     08/26/21  0325 08/27/21  0213 08/28/21  0207   WBC 11.9* 10.1 8.6   RBC 5.38 5.45 5.45   HEMOGLOBIN 15.9 16.2 16.3   HEMATOCRIT 46.6 47.6 47.1   MCV 86.6 87.3 86.4   MCH 29.6 29.7 29.9   MCHC 34.1 34.0 34.6   RDW 40.1 40.8 40.0   PLATELETCT 278 267 248   MPV 9.2 9.6 9.0     Recent Labs     08/26/21  0325 08/27/21  0213 08/28/21  0207   SODIUM 132* 134* 136   POTASSIUM 4.1 4.3 4.2   CHLORIDE 94* 94* 96   CO2 29 27 26   GLUCOSE 99 104* 111*   BUN 31* 35* 32*   CREATININE 0.91 0.85 0.87   CALCIUM 9.3 9.2 9.2                   Imaging  US-EXTREMITY VENOUS LOWER BILAT   Final Result      DX-CHEST-PORTABLE (1 VIEW)   Final Result      1.  Bilateral perihilar and lower lobe interstitial opacity which represent interstitial edema or pneumonia.      2.  No lobar consolidation.              Assessment/Plan  * Acute hypoxemic respiratory failure due to COVID-19 (HCC)- (present on admission)  Assessment & Plan  Patient 1 week ago, on 8/8/2021, started having fevers and chills.  He went to the local emergency room in [place].  There they diagnosed him with COVID-19 infection.  He was sent home on 8/9/2021.  CompletedDecadron  DVT prophylaxis dose of Lovenox   Patient is unvaccinated.      Elevated LFTs- (present on admission)  Assessment & Plan  Likely due to COVID 19 infection  Improving  Monitor     Hyponatremia- (present on admission)  Assessment & Plan  Mild  Monitor     Acute respiratory failure with hypoxia (HCC)- (present on admission)  Assessment & Plan  Secondary to bilateral COVID-19 infection  Weaned down to  nasal cannula.  Optimize oxygen use to keep oxygen saturations above 90%.       VTE prophylaxis: enoxaparin ppx    I have performed a physical exam and reviewed and updated ROS and Plan today (8/28/2021). In review of yesterday's note (8/27/2021), there are no changes except as documented above.

## 2021-08-28 NOTE — CARE PLAN
The patient is Stable - Low risk of patient condition declining or worsening    Shift Goals  Clinical Goals: continue to titrate O2  Patient Goals: continue proning    Progress made toward(s) clinical / shift goals: continuous pulse ox on; 6L nasal cannula; breath sounds clear but dim and then dim at bases; pt in prone position; IS in reach      Problem: Respiratory  Goal: Patient will achieve/maintain optimum respiratory ventilation and gas exchange  Outcome: Progressing  Note: Pt continues to prone and O2 is able to be titrated down. Encouraged pt to continue proning and practice IS.     Problem: Pain - Standard  Goal: Alleviation of pain or a reduction in pain to the patient’s comfort goal  Outcome: Progressing  Note: Pt complains of chest/rib cage pain associated with coughing. Educated pt that he has PRNs available for cough suppression if needed, pt verbalized understanding.

## 2021-08-29 LAB
ANION GAP SERPL CALC-SCNC: 12 MMOL/L (ref 7–16)
BUN SERPL-MCNC: 30 MG/DL (ref 8–22)
CALCIUM SERPL-MCNC: 9 MG/DL (ref 8.4–10.2)
CHLORIDE SERPL-SCNC: 94 MMOL/L (ref 96–112)
CO2 SERPL-SCNC: 27 MMOL/L (ref 20–33)
CREAT SERPL-MCNC: 0.79 MG/DL (ref 0.5–1.4)
ERYTHROCYTE [DISTWIDTH] IN BLOOD BY AUTOMATED COUNT: 40.4 FL (ref 35.9–50)
GLUCOSE SERPL-MCNC: 103 MG/DL (ref 65–99)
HCT VFR BLD AUTO: 47.9 % (ref 42–52)
HGB BLD-MCNC: 16.5 G/DL (ref 14–18)
MCH RBC QN AUTO: 29.9 PG (ref 27–33)
MCHC RBC AUTO-ENTMCNC: 34.4 G/DL (ref 33.7–35.3)
MCV RBC AUTO: 86.9 FL (ref 81.4–97.8)
PLATELET # BLD AUTO: 231 K/UL (ref 164–446)
PMV BLD AUTO: 9.4 FL (ref 9–12.9)
POTASSIUM SERPL-SCNC: 4.2 MMOL/L (ref 3.6–5.5)
RBC # BLD AUTO: 5.51 M/UL (ref 4.7–6.1)
SODIUM SERPL-SCNC: 133 MMOL/L (ref 135–145)
WBC # BLD AUTO: 7.6 K/UL (ref 4.8–10.8)

## 2021-08-29 PROCEDURE — A9270 NON-COVERED ITEM OR SERVICE: HCPCS | Performed by: INTERNAL MEDICINE

## 2021-08-29 PROCEDURE — 700102 HCHG RX REV CODE 250 W/ 637 OVERRIDE(OP): Performed by: INTERNAL MEDICINE

## 2021-08-29 PROCEDURE — 80048 BASIC METABOLIC PNL TOTAL CA: CPT

## 2021-08-29 PROCEDURE — 99232 SBSQ HOSP IP/OBS MODERATE 35: CPT | Performed by: INTERNAL MEDICINE

## 2021-08-29 PROCEDURE — 770020 HCHG ROOM/CARE - TELE (206)

## 2021-08-29 PROCEDURE — 700111 HCHG RX REV CODE 636 W/ 250 OVERRIDE (IP): Performed by: INTERNAL MEDICINE

## 2021-08-29 PROCEDURE — 85027 COMPLETE CBC AUTOMATED: CPT

## 2021-08-29 RX ADMIN — FUROSEMIDE 40 MG: 40 INJECTION, SOLUTION INTRAMUSCULAR; INTRAVENOUS at 05:47

## 2021-08-29 RX ADMIN — GUAIFENESIN 1200 MG: 600 TABLET, EXTENDED RELEASE ORAL at 17:04

## 2021-08-29 RX ADMIN — OMEPRAZOLE 20 MG: 20 CAPSULE, DELAYED RELEASE ORAL at 05:48

## 2021-08-29 RX ADMIN — BENZONATATE 100 MG: 100 CAPSULE ORAL at 06:19

## 2021-08-29 RX ADMIN — GUAIFENESIN 1200 MG: 600 TABLET, EXTENDED RELEASE ORAL at 05:48

## 2021-08-29 RX ADMIN — BENZONATATE 100 MG: 100 CAPSULE ORAL at 20:59

## 2021-08-29 RX ADMIN — ENOXAPARIN SODIUM 40 MG: 40 INJECTION SUBCUTANEOUS at 05:47

## 2021-08-29 ASSESSMENT — ENCOUNTER SYMPTOMS
INSOMNIA: 0
PHOTOPHOBIA: 0
BLURRED VISION: 0
HEARTBURN: 0
NERVOUS/ANXIOUS: 0
DEPRESSION: 0
SENSORY CHANGE: 0
CHILLS: 0
WEAKNESS: 0
CLAUDICATION: 0
DIZZINESS: 0
DIARRHEA: 0
ABDOMINAL PAIN: 0
SHORTNESS OF BREATH: 1
FEVER: 0
SPEECH CHANGE: 0
HEADACHES: 0
COUGH: 0
MYALGIAS: 0
CONSTIPATION: 0
VOMITING: 0
SORE THROAT: 0

## 2021-08-29 ASSESSMENT — PAIN DESCRIPTION - PAIN TYPE
TYPE: ACUTE PAIN

## 2021-08-29 NOTE — DISCHARGE PLANNING
Anticipated Discharge Disposition: Home with home oxygen    Action: Discussed pt in morning rounds. Per MD, pt is cleared to d/c today, possibly with home oxygen.    0905: LSW received messaged from ROSALVA Becker stating pt is a candidate for d/c and will need a ride to [facility].     0945: LSW requested Rosita ask if pt has insurance and if they can pay out of pocket for oxygen.    0951: Per Rosita, pt does not have insurance and can pay out of pocket for oxygen depending on the price.    0955: LSW called [facility] to find out cost for out of pocket. Per Kaylee, they are not accepting new oxygen referrals at this time.    1000: LSW called [employer], not open on weekend. Per YESENIA [facility] does not do private pay.    1003: Per YESENIA we will likely need to coordinate with a weekday DME agency.    1012: LSW called [facility]. Per Emily, they do not service [facility].    1013: LSW messaged Rosita and MD Javed to inform them that pt will not get set up with oxygen today.    Barriers to Discharge: no insurance, [place]    Plan: LSW/RN CM to help set up oxygen tomorrow. LSW/RN CM to help set up transport tomorrow.

## 2021-08-29 NOTE — PROGRESS NOTES
Report from ROSALVA Becker and Amirah RN, care assumed.     Pt A&Ox4, on 4L and proning and planning to sleep that way.     POC discussed with pt, pt has no questions about anything. Universal fall and safety precautions in place, call light within reach. Pt has no current needs. Care assumed.

## 2021-08-29 NOTE — CARE PLAN
The patient is Watcher - Medium risk of patient condition declining or worsening    Shift Goals  Clinical Goals: walking O2  Patient Goals: discharge planning    Progress made toward(s) clinical / shift goals:  MD aware of walking O2 results; consulting SW for d/c planning assistance      Problem: Discharge Barriers/Planning  Goal: Patient's continuum of care needs are met  Outcome: Progressing  Note: Walking O2 complete - MD aware. Contacted SW to assist in transportation home for pt since he lives in [place] and does not have a ride available.     Problem: Mobility  Goal: Patient's capacity to carry out activities will improve  Outcome: Progressing  Note: Pt remaining independent in mobility - adjusting O2 for ambulation needs

## 2021-08-29 NOTE — PROGRESS NOTES
Telemetry Shift Summary    Rhythm: SR  HR: 70-80  Ectopy: R-PVC    Measurements for strip printed 8/29/2021 at 1407  HR 72  0.18 / 0.10 / 0.40    Normal Values  Rhythm: SR  HR:   Measurements: 0.12-0.20 / 0.06-0.10 / 0.30-0.52

## 2021-08-29 NOTE — FACE TO FACE
"Face to Face Note  -  Durable Medical Equipment    Sierra Javed D.O. - NPI: [id number]  I certify that this patient is under my care and that they had a durable medical equipment(DME)face to face encounter by myself that meets the physician DME face-to-face encounter requirements with this patient on:    Date of encounter:   Patient:                    MRN:                       YOB: 2023  Venkat Mejía  [id number]  1965     The encounter with the patient was in whole, or in part, for the following medical condition, which is the primary reason for durable medical equipment:  Covid-19 Infection    I certify that, based on my findings, the following durable medical equipment is medically necessary:  Oxygen.    HOME O2 Saturation Measurements:(Values must be present for Home Oxygen orders)  Room air sat at rest: 88  Room air sat with amb: 82  With liters of O2: 3, O2 sat at rest with O2: 90  With Liters of O2: 5, O2 sat with amb with O2 : 91  Is the patient mobile?: Yes    My Clinical findings support the need for the above equipment due to:  Hypoxia    Supporting Symptoms: The patient requires supplemental oxygen, as the following interventions have been tried with limited or no improvement: \"Positive expiratory pressure therapies, \"Oral and/or IV steroids, \"Ambulation with oximetry and \"Incentive spirometry    If patient feels more short of breath, they can go up to 6 liters per minute and contact healthcare provider.  "

## 2021-08-29 NOTE — PROGRESS NOTES
Telemetry Shift Summary    Rhythm SB/SR  HR Range 54-74  Ectopy R-PVC  Measurements 0.20/0.08/0.40        Normal Values  Rhythm SR  HR Range    Measurements 0.12-0.20 / 0.06-0.10  / 0.30-0.52

## 2021-08-29 NOTE — PROGRESS NOTES
Hospital Medicine Daily Progress Note    Date of Service  8/29/2021    Chief Complaint  Venkat Mejía is a 56 y.o. male admitted 8/15/2021 with shortness of breath.    Hospital Course  55 yo male transferred from [place] with worsening SOB dx covid 8/8/21 and b/l infiltrates and high o2 requirement. Transitioned to high flow and improved over the course of few days so transferred out of the ICU on 8/17/2021.   Dexamethasone  Being diuresed.  Received tocilimuzab on 8/16  Not a remdesivir candidate.  Downgraded to telemetry status on 8/24      Interval Problem Update  8/25 Patient states he is feeling better and no new complaints.  He is still on High flow 40L/45% fio2.  He is diuresing well with lasix bid.   8/26 Patient weaned from high flow down to 10L oxymask.  I will add scheduled mucinex as he is seemingly coughing slightly more today.  8/27 Patient continuing to show improvement, he denies complaint and states he is improving.  Oxygen needs at 7L today.  He completed decadron treatment on 8/25.  D dimer down to 3.81 from >20, will change lovenox dosage to DVT prophylaxis dose from full treatment dose.  8/28 Patient feeling well today, needing slightly less oxygen 6-7L.  I'm cutting back diuresis to once daily to make sure it does not adversely affect his oxygen needs.  Once he is on 4L or less, anticipate dc home to [place].  8/29 Patient feeling okay, wanting to dc home but cannot get oxygen delivered until tomorrow given his home location in [place].  He also needs help to get home as he was transported here for medical care.     I have personally seen and examined the patient at bedside. I discussed the plan of care with patient, bedside RN, charge RN,  and pharmacy.    Consultants/Specialty  critical care    Code Status  Full Code    Disposition  Patient is medically cleared.   Anticipate discharge to to home with close outpatient follow-up.  I have placed the  appropriate orders for post-discharge needs.    Review of Systems  Review of Systems   Constitutional: Negative for chills and fever.   HENT: Negative for congestion and sore throat.    Eyes: Negative for blurred vision and photophobia.   Respiratory: Positive for shortness of breath (better). Negative for cough.    Cardiovascular: Negative for chest pain, claudication and leg swelling.   Gastrointestinal: Negative for abdominal pain, constipation, diarrhea, heartburn and vomiting.   Genitourinary: Negative for dysuria and hematuria.   Musculoskeletal: Negative for joint pain and myalgias.   Skin: Negative for itching and rash.   Neurological: Negative for dizziness, sensory change, speech change, weakness and headaches.   Psychiatric/Behavioral: Negative for depression. The patient is not nervous/anxious and does not have insomnia.         Physical Exam  Temp:  [36.6 °C (97.8 °F)-36.8 °C (98.3 °F)] 36.6 °C (97.8 °F)  Pulse:  [60-81] 70  Resp:  [16-20] 18  BP: (102-127)/(6-78) 108/64  SpO2:  [89 %-95 %] 92 %    Physical Exam  Vitals and nursing note reviewed.   Constitutional:       General: He is not in acute distress.     Appearance: Normal appearance.   HENT:      Head: Normocephalic and atraumatic.   Eyes:      General: No scleral icterus.     Extraocular Movements: Extraocular movements intact.   Cardiovascular:      Rate and Rhythm: Normal rate and regular rhythm.      Pulses: Normal pulses.      Heart sounds: Normal heart sounds. No murmur heard.     Pulmonary:      Effort: Pulmonary effort is normal. No respiratory distress.      Breath sounds: No wheezing, rhonchi or rales.      Comments: Decreased BS at bases.    Abdominal:      General: Abdomen is flat. Bowel sounds are normal. There is no distension.      Palpations: Abdomen is soft.      Tenderness: There is no rebound.   Musculoskeletal:         General: No swelling or tenderness.      Cervical back: Normal range of motion and neck supple.    Lymphadenopathy:      Cervical: No cervical adenopathy.   Skin:     Coloration: Skin is not jaundiced.      Findings: No erythema.   Neurological:      General: No focal deficit present.      Mental Status: He is alert and oriented to person, place, and time. Mental status is at baseline.      Cranial Nerves: No cranial nerve deficit.   Psychiatric:         Mood and Affect: Mood normal.         Behavior: Behavior normal.         Fluids    Intake/Output Summary (Last 24 hours) at 8/29/2021 1320  Last data filed at 8/29/2021 1204  Gross per 24 hour   Intake 1040 ml   Output 1025 ml   Net 15 ml       Laboratory  Recent Labs     08/27/21  0213 08/28/21  0207 08/29/21  0228   WBC 10.1 8.6 7.6   RBC 5.45 5.45 5.51   HEMOGLOBIN 16.2 16.3 16.5   HEMATOCRIT 47.6 47.1 47.9   MCV 87.3 86.4 86.9   MCH 29.7 29.9 29.9   MCHC 34.0 34.6 34.4   RDW 40.8 40.0 40.4   PLATELETCT 267 248 231   MPV 9.6 9.0 9.4     Recent Labs     08/27/21  0213 08/28/21  0207 08/29/21  0228   SODIUM 134* 136 133*   POTASSIUM 4.3 4.2 4.2   CHLORIDE 94* 96 94*   CO2 27 26 27   GLUCOSE 104* 111* 103*   BUN 35* 32* 30*   CREATININE 0.85 0.87 0.79   CALCIUM 9.2 9.2 9.0                   Imaging  US-EXTREMITY VENOUS LOWER BILAT   Final Result      DX-CHEST-PORTABLE (1 VIEW)   Final Result      1.  Bilateral perihilar and lower lobe interstitial opacity which represent interstitial edema or pneumonia.      2.  No lobar consolidation.              Assessment/Plan  * Acute hypoxemic respiratory failure due to COVID-19 (HCC)- (present on admission)  Assessment & Plan  Patient 1 week ago, on 8/8/2021, started having fevers and chills.  He went to the local emergency room in [place].  There they diagnosed him with COVID-19 infection.  He was sent home on 8/9/2021.  Completed Decadron  DVT prophylaxis dose of Lovenox   Patient is unvaccinated.      Elevated LFTs- (present on admission)  Assessment & Plan  Likely due to COVID 19 infection  Improving  Monitor      Hyponatremia- (present on admission)  Assessment & Plan  Mild  Monitor     Acute respiratory failure with hypoxia (HCC)- (present on admission)  Assessment & Plan  Secondary to bilateral COVID-19 infection  Weaned down to nasal cannula.  Optimize oxygen use to keep oxygen saturations above 90%.       VTE prophylaxis: enoxaparin ppx    I have performed a physical exam and reviewed and updated ROS and Plan today (8/29/2021). In review of yesterday's note (8/28/2021), there are no changes except as documented above.

## 2021-08-30 LAB
ANION GAP SERPL CALC-SCNC: 10 MMOL/L (ref 7–16)
BUN SERPL-MCNC: 26 MG/DL (ref 8–22)
CALCIUM SERPL-MCNC: 8.9 MG/DL (ref 8.4–10.2)
CHLORIDE SERPL-SCNC: 97 MMOL/L (ref 96–112)
CO2 SERPL-SCNC: 27 MMOL/L (ref 20–33)
CREAT SERPL-MCNC: 0.84 MG/DL (ref 0.5–1.4)
ERYTHROCYTE [DISTWIDTH] IN BLOOD BY AUTOMATED COUNT: 40.6 FL (ref 35.9–50)
GLUCOSE SERPL-MCNC: 104 MG/DL (ref 65–99)
HCT VFR BLD AUTO: 45.4 % (ref 42–52)
HGB BLD-MCNC: 15.3 G/DL (ref 14–18)
MCH RBC QN AUTO: 29.6 PG (ref 27–33)
MCHC RBC AUTO-ENTMCNC: 33.7 G/DL (ref 33.7–35.3)
MCV RBC AUTO: 87.8 FL (ref 81.4–97.8)
PLATELET # BLD AUTO: 214 K/UL (ref 164–446)
PMV BLD AUTO: 9.2 FL (ref 9–12.9)
POTASSIUM SERPL-SCNC: 4.2 MMOL/L (ref 3.6–5.5)
RBC # BLD AUTO: 5.17 M/UL (ref 4.7–6.1)
SODIUM SERPL-SCNC: 134 MMOL/L (ref 135–145)
WBC # BLD AUTO: 6.5 K/UL (ref 4.8–10.8)

## 2021-08-30 PROCEDURE — 700102 HCHG RX REV CODE 250 W/ 637 OVERRIDE(OP): Performed by: INTERNAL MEDICINE

## 2021-08-30 PROCEDURE — A9270 NON-COVERED ITEM OR SERVICE: HCPCS | Performed by: HOSPITALIST

## 2021-08-30 PROCEDURE — 85027 COMPLETE CBC AUTOMATED: CPT

## 2021-08-30 PROCEDURE — 94760 N-INVAS EAR/PLS OXIMETRY 1: CPT

## 2021-08-30 PROCEDURE — 700111 HCHG RX REV CODE 636 W/ 250 OVERRIDE (IP): Performed by: INTERNAL MEDICINE

## 2021-08-30 PROCEDURE — 99232 SBSQ HOSP IP/OBS MODERATE 35: CPT | Performed by: INTERNAL MEDICINE

## 2021-08-30 PROCEDURE — A9270 NON-COVERED ITEM OR SERVICE: HCPCS | Performed by: INTERNAL MEDICINE

## 2021-08-30 PROCEDURE — 80048 BASIC METABOLIC PNL TOTAL CA: CPT

## 2021-08-30 PROCEDURE — 770006 HCHG ROOM/CARE - MED/SURG/GYN SEMI*

## 2021-08-30 PROCEDURE — 700102 HCHG RX REV CODE 250 W/ 637 OVERRIDE(OP): Performed by: HOSPITALIST

## 2021-08-30 RX ORDER — ERGOCALCIFEROL 1.25 MG/1
50000 CAPSULE ORAL
Status: DISCONTINUED | OUTPATIENT
Start: 2021-08-30 | End: 2021-08-31 | Stop reason: HOSPADM

## 2021-08-30 RX ADMIN — BENZONATATE 100 MG: 100 CAPSULE ORAL at 20:21

## 2021-08-30 RX ADMIN — ENOXAPARIN SODIUM 40 MG: 40 INJECTION SUBCUTANEOUS at 05:31

## 2021-08-30 RX ADMIN — OMEPRAZOLE 20 MG: 20 CAPSULE, DELAYED RELEASE ORAL at 05:33

## 2021-08-30 RX ADMIN — GUAIFENESIN 1200 MG: 600 TABLET, EXTENDED RELEASE ORAL at 05:33

## 2021-08-30 RX ADMIN — ERGOCALCIFEROL 50000 UNITS: 1.25 CAPSULE ORAL at 14:45

## 2021-08-30 RX ADMIN — GUAIFENESIN 1200 MG: 600 TABLET, EXTENDED RELEASE ORAL at 17:49

## 2021-08-30 RX ADMIN — ACETAMINOPHEN 650 MG: 325 TABLET, FILM COATED ORAL at 01:31

## 2021-08-30 RX ADMIN — BENZONATATE 100 MG: 100 CAPSULE ORAL at 05:33

## 2021-08-30 ASSESSMENT — ENCOUNTER SYMPTOMS
FEVER: 0
HEADACHES: 0
SPEECH CHANGE: 0
HEARTBURN: 0
DIARRHEA: 0
PHOTOPHOBIA: 0
ABDOMINAL PAIN: 0
CHILLS: 0
SHORTNESS OF BREATH: 1
CONSTIPATION: 0
NERVOUS/ANXIOUS: 0
MYALGIAS: 0
DEPRESSION: 0
SORE THROAT: 0
DIZZINESS: 0
SENSORY CHANGE: 0
CLAUDICATION: 0
VOMITING: 0
INSOMNIA: 0
BLURRED VISION: 0
COUGH: 0
WEAKNESS: 0

## 2021-08-30 ASSESSMENT — PAIN DESCRIPTION - PAIN TYPE
TYPE: ACUTE PAIN

## 2021-08-30 NOTE — DISCHARGE PLANNING
Anticipated Discharge Disposition: home with DME oxygen.     Action: Pt does not currently have insurance needed to discharge from the hospital with oxygen through insurance.     Per YESENIA Nobles [facility] is currently not accepting new patients for private pay.     Giuliano has voicemail out to [facility] to inquire about pt being serviced in [place] for oxygen. Per chart review, patient would be able to pay privately for oxygen.     Left voicemail for daughter Janel as note from [facility] on 8/25 stated that she did not show to apply for pt's medicaid. Requested callback for assistance on discharge planning.     Update @ 1:47 pm: received phone call back from Janel. She stated that she had gone into to meet with [facility] to apply for pt's medicaid but she was told there wasn't anyone there named Maria Fernanda who she thought she was meeting with.     Update @ 2:15 pm: Spoke with [facility] regarding pt. They do confirm that [facility] does have a PFA rep named Maria Fernanda. It is unclear where the daughter was meeting with Maria Fernanda at though. Left voicemail for Janel with Maria Fernanda's phone number to connect [facility] and pt's daughter for completion of medicaid application.      Update @ 2:59 pm: pt has option to pay privately for oxygen at $150 a month. Notified RN who confirmed the pt wants to move forward with private pay option for oxygen.     Pt will receive concentrator, which costs $100. Each tank will cost $20. He will be serviced out of the [facility] branch.     Pt verbally signed choice form for [facility] via communcation with RN.    Faxed to [facility] at [phone]       Update @ 4:35 pm:  Pt's discharge address is [address] [place] [address]    Patient phone number is [phone]    Spoke with Trace, who is the manager for the [facility] branch. He will be the  delivering concentrator for the pt. Provided contact information to Trace for the pt.     Trace phone number: [phone]. Provided this phone number  to RN for pt to call when an hour out from [place].     Update @ 4:57 pm:   Transport confirmed for approx 5 pm. Notified Stephanie [facility] of updated time.     Barriers to Discharge: dme oxygen set up     Plan: LSW to assist as needed and monitor for barriers to discharge.

## 2021-08-30 NOTE — DISCHARGE PLANNING
Received Transport Form @ 16:00  Spoke to Raphael/ SAM @ 16:42    Transport is scheduled for 08/30/21 @18:00 going to home.    *isolation precautions due to covid.    Notified care team via Voalte of scheduled discharge.

## 2021-08-30 NOTE — DISCHARGE PLANNING
Agency/Facility Name: [facility]  Spoke To:   Outcome: YESENIA spoke with manager of [facility] regarding service area as well as private pay at #[phone]    [facility] can service Pt home area of [place], however facility will need to speak with [facility] regarding approved service.     [facility] manager to contact DPA with follow up regarding approved service.    ROGERS notified.     [id number]-  Agency/Facility Name: [facility]   Spoke To: Trace  Outcome: Per [facility] manager Trace: approved service for this branch would require a single patient agreement to be signed and reviewed by ROGERS then submitted for corporate [facility] review. YESENIA requested timeline for corporate approval and Trace was unable to provide ETA.    Self Pay: $100 for concentrator $20 for tanks. Around $150 for monthly setup.    ROGERS notified, DPA to contact Trace at #[phone] upon self pay or approved service for Pt.    9012-  Agency/Facility Name: [facility]   Spoke To: Trace  Outcome: Pt will accept self pay, DPA to fax referral to [facility]. Trace advised DPA he will be contacting the [facility] for DME delivery coordination.     ROGERS notified.    1537-  Agency/Facility Name: [facility]  Spoke To: Trace  Outcome: YESENIA was advised [facility] tanks should be taken to Pt room from respiratory storage. Fax still in pending, Trace to contact YESENIA if fax does not arrive.    ROGERS notified.     9753-  Agency/Facility Name: [facility]  Spoke To: [place]  Outcome: YESENIA confirmed concentrator will be delivered to Pts home. Pt will be on [facility] oxygen while in transit, requesting Pt call [facility] when an hour away from home.     ROGERS notified

## 2021-08-30 NOTE — PROGRESS NOTES
Report and updates from ROSALVA Becker.     Pt has discharge order in, and SW and CM are working on details for home O2 since pt does not have insurance.     Pt resting in bed, proning. A&Ox4, on 4L O2, up by SBA. Discussed POC with pt, pt agreeable to continue to prone throughout the night.     Call light within reach, universal fall and safety precautions in place, care assumed.

## 2021-08-30 NOTE — PROGRESS NOTES
Telemetry Shift Summary    Rhythm SR  HR Range 58-82  Ectopy R-PVC, couplets  Measurements 0.18/0.10/0.38        Normal Values  Rhythm SR  HR Range    Measurements 0.12-0.20 / 0.06-0.10  / 0.30-0.52

## 2021-08-30 NOTE — PROGRESS NOTES
[facility] Medicine Daily Progress Note    Date of Service  8/30/2021    Chief Complaint  Venkat Mejía is a 56 y.o. male admitted 8/15/2021 with shortness of breath.    Hospital Course  55 yo male transferred from [place] with worsening SOB dx covid 8/8/21 and b/l infiltrates and high o2 requirement. Transitioned to high flow and improved over the course of few days so transferred out of the ICU on 8/17/2021.   Dexamethasone completed  Diuresis completed  Received tocilimuzab on 8/16  Not a remdesivir candidate.  Downgraded to telemetry status on 8/24      Interval Problem Update  8/25 Patient states he is feeling better and no new complaints.  He is still on High flow 40L/45% fio2.  He is diuresing well with lasix bid.   8/26 Patient weaned from high flow down to 10L oxymask.  I will add scheduled mucinex as he is seemingly coughing slightly more today.  8/27 Patient continuing to show improvement, he denies complaint and states he is improving.  Oxygen needs at 7L today.  He completed decadron treatment on 8/25.  D dimer down to 3.81 from >20, will change lovenox dosage to DVT prophylaxis dose from full treatment dose.  8/28 Patient feeling well today, needing slightly less oxygen 6-7L.  I'm cutting back diuresis to once daily to make sure it does not adversely affect his oxygen needs.  Once he is on 4L or less, anticipate dc home to [place].  8/29 Patient feeling okay, wanting to dc home but cannot get oxygen delivered until tomorrow given his home location in [place].  He also needs help to get home as he was transported here for medical care.   8/30 Patient feeling well, difficult discharge to get home in [facility] and to get home oxygen as self pay.  Case management is working on this to get patient home safely.    I have personally seen and examined the patient at bedside. I discussed the plan of care with patient, bedside RN, charge RN,  and  pharmacy.    Consultants/Specialty  critical care    Code Status  Full Code    Disposition  Patient is medically cleared.   Anticipate discharge to to home with close outpatient follow-up.  I have placed the appropriate orders for post-discharge needs.    Review of Systems  Review of Systems   Constitutional: Negative for chills and fever.   HENT: Negative for congestion and sore throat.    Eyes: Negative for blurred vision and photophobia.   Respiratory: Positive for shortness of breath (better). Negative for cough.    Cardiovascular: Negative for chest pain, claudication and leg swelling.   Gastrointestinal: Negative for abdominal pain, constipation, diarrhea, heartburn and vomiting.   Genitourinary: Negative for dysuria and hematuria.   Musculoskeletal: Negative for joint pain and myalgias.   Skin: Negative for itching and rash.   Neurological: Negative for dizziness, sensory change, speech change, weakness and headaches.   Psychiatric/Behavioral: Negative for depression. The patient is not nervous/anxious and does not have insomnia.         Physical Exam  Temp:  [36.3 °C (97.4 °F)-36.8 °C (98.3 °F)] (P) 36.4 °C (97.6 °F)  Pulse:  [61-83] (P) 75  Resp:  [18] (P) 18  BP: ()/(60-69) (P) 107/64  SpO2:  [90 %-98 %] (P) 90 %    Physical Exam  Vitals and nursing note reviewed.   Constitutional:       General: He is not in acute distress.     Appearance: Normal appearance.   HENT:      Head: Normocephalic and atraumatic.   Eyes:      General: No scleral icterus.     Extraocular Movements: Extraocular movements intact.   Cardiovascular:      Rate and Rhythm: Normal rate and regular rhythm.      Pulses: Normal pulses.      Heart sounds: Normal heart sounds. No murmur heard.     Pulmonary:      Effort: Pulmonary effort is normal. No respiratory distress.      Breath sounds: No wheezing, rhonchi or rales.      Comments: Decreased BS at bases.    Abdominal:      General: Abdomen is flat. Bowel sounds are normal. There  is no distension.      Palpations: Abdomen is soft.      Tenderness: There is no rebound.   Musculoskeletal:         General: No swelling or tenderness.      Cervical back: Normal range of motion and neck supple.   Lymphadenopathy:      Cervical: No cervical adenopathy.   Skin:     Coloration: Skin is not jaundiced.      Findings: No erythema.   Neurological:      General: No focal deficit present.      Mental Status: He is alert and oriented to person, place, and time. Mental status is at baseline.      Cranial Nerves: No cranial nerve deficit.   Psychiatric:         Mood and Affect: Mood normal.         Behavior: Behavior normal.         Fluids    Intake/Output Summary (Last 24 hours) at 8/30/2021 1419  Last data filed at 8/29/2021 1528  Gross per 24 hour   Intake --   Output 200 ml   Net -200 ml       Laboratory  Recent Labs     08/28/21  0207 08/29/21  0228 08/30/21  0438   WBC 8.6 7.6 6.5   RBC 5.45 5.51 5.17   HEMOGLOBIN 16.3 16.5 15.3   HEMATOCRIT 47.1 47.9 45.4   MCV 86.4 86.9 87.8   MCH 29.9 29.9 29.6   MCHC 34.6 34.4 33.7   RDW 40.0 40.4 40.6   PLATELETCT 248 231 214   MPV 9.0 9.4 9.2     Recent Labs     08/28/21  0207 08/29/21  0228 08/30/21  0438   SODIUM 136 133* 134*   POTASSIUM 4.2 4.2 4.2   CHLORIDE 96 94* 97   CO2 26 27 27   GLUCOSE 111* 103* 104*   BUN 32* 30* 26*   CREATININE 0.87 0.79 0.84   CALCIUM 9.2 9.0 8.9                   Imaging  US-EXTREMITY VENOUS LOWER BILAT   Final Result      DX-CHEST-PORTABLE (1 VIEW)   Final Result      1.  Bilateral perihilar and lower lobe interstitial opacity which represent interstitial edema or pneumonia.      2.  No lobar consolidation.              Assessment/Plan  * Acute hypoxemic respiratory failure due to COVID-19 (HCC)- (present on admission)  Assessment & Plan  Patient 1 week ago, on 8/8/2021, started having fevers and chills.  He went to the local emergency room in [place].  There they diagnosed him with COVID-19 infection.  He was sent home  on 8/9/2021.  Completed Decadron  DVT prophylaxis dose of Lovenox   Patient is unvaccinated.      Elevated LFTs- (present on admission)  Assessment & Plan  Likely due to COVID 19 infection  Improving  Monitor     Hyponatremia- (present on admission)  Assessment & Plan  Mild  Monitor     Acute respiratory failure with hypoxia (HCC)- (present on admission)  Assessment & Plan  Secondary to bilateral COVID-19 infection  Weaned down to nasal cannula.  Optimize oxygen use to keep oxygen saturations above 90%.       VTE prophylaxis: enoxaparin ppx    I have performed a physical exam and reviewed and updated ROS and Plan today (8/30/2021). In review of yesterday's note (8/29/2021), there are no changes except as documented above.

## 2021-08-30 NOTE — DISCHARGE INSTRUCTIONS
Discharge Instructions    Discharged to home by medical transportation with escort. Discharged via wheelchair, hospital escort: Yes.  Special equipment needed: Oxygen    Be sure to schedule a follow-up appointment with your primary care doctor or any specialists as instructed.     Discharge Plan:     INSTRUCTIONS FOR COVID-19 OR ANY OTHER INFECTIOUS RESPIRATORY ILLNESSES    The Centers for Disease Control and Prevention (CDC) states that early indications for COVID-19 include cough, shortness of breath, difficulty breathing, or at least two of the following symptoms: chills, shaking with chills, muscle pain, headache, sore throat, and loss of taste or smell. Symptoms can range from mild to severe and may appear up to two weeks after exposure to the virus.    The practice of self-isolation and quarantine helps protect the public and your family by  preventing exposure to people who have or may have a contagious disease. Please follow the prevention steps below as based on CDC guidelines:    WHEN TO STOP ISOLATION: Persons with COVID-19 or any other infectious respiratory illness who have symptoms and were advised to care for themselves at home may discontinue home isolation under the following conditions:  · At least 24 hours have passed since recovery defined as resolution of fever without the use of fever-reducing medications; AND,  · Improvement in respiratory symptoms (e.g., cough, shortness of breath); AND,  · At least 10 days have passed since symptoms first appeared and have had no subsequent illness.    MONITOR YOUR SYMPTOMS: If your illness is worsening, seek prompt medical attention. If you have a medical emergency and need to call 911, notify the dispatch personnel that you have, or are being evaluated for confirmed or suspected COVID-19 or another infectious respiratory illness. Wear a facemask if possible.    ACTIVITY RESTRICTION: restrict activities outside your home, except for getting medical care. Do  not go to work, school, or public areas. Avoid using public transportation, ride-sharing, or taxis.    SCHEDULED MEDICAL APPOINTMENTS: Notify your provider that you have, or are being evaluated for, confirmed or suspected COVID-19 or another infectious respiratory. This will help the healthcare provider’s office safely take care of you and keep other people from getting exposed or infected.    FACEMASKS, when to wear: Anytime you are away from your home or around other people or pets. If you are unable to wear one, maintain a minimum of 6 feet distancing from others.    LIVING ENVIRONMENT: Stay in a separate room from other people and pets. If possible, use a separate bathroom, have someone else care for your pets and avoid sharing household items. Any items used should be washed thoroughly with soap and water. Clean all “high-touch” surfaces every day. Use a household cleaning spray or wipe, according to the label instructions. High touch surfaces include (but are not limited to) counters, tabletops, doorknobs, bathroom fixtures, toilets, phones, keyboards, tablets, and bedside tables.     HAND WASHING: Frequently wash hands with soap and water for at least 20 seconds,  especially after blowing your nose, coughing, or sneezing; going to the bathroom; before and after interacting with pets; and before and after eating or preparing food. If hands are visibly dirty use soap and water. If soap and water are not available, use an alcohol-based hand  with at least 60% alcohol. Avoid touching your eyes, nose, and mouth with unwashed hands. Cover your coughs and sneezes with a tissue. Throw used tissues in a lined trash can. Immediately wash your hands.    ACTIVE/FACILITATED SELF-MONITORING: Follow instructions provided by your local health department or health professionals, as appropriate. When working with your local health department check their available hours.    [facility]   Phone Number   [place] [phone]    [place] [phone]   [place] [address]   [place] [phone]     IF YOU HAVE CONFIRMED POSITIVE COVID-19:    Those who have completely recovered from COVID-19 may have immune-boosting antibodies in their plasma--called “convalescent plasma”--that could be used to treat critically ill COVID19 patients.    [facility] is excited to begin working with [facility] on collecting convalescent plasma from  people who have recovered from COVID-19 as part of a program to treat patients infected with the virus. This FDA-approved “emergency investigational new drug” is a special blood product containing antibodies that may give patients an extra boost to fight the virus.    To be eligible to donate convalescent plasma, you must have a prior COVID-19 diagnosis documented by a laboratory test (or a positive test result for SARS-CoV-2 antibodies) and meet additional eligibility requirements.    If you are interested in donating convalescent plasma or have any additional questions, please contact the [facility] Convalescent Plasma  at [phone] or via e-mail at [email or web address].    INSTRUCCIONES PARA LA COVID-19 O CUALQUIER OTRA  ENFERMEDAD RESPIRATORIA INFECCIOSA    Los Centros para el Control y la Prevención de Enfermedades (Centers for Disease Control and Prevention, CDC) señalan que los primeros signos de la COVID-19 incluyen tos, falta de aire, dificultad para respirar o al menos dos de los siguientes síntomas: escalofríos, temblor con escalofríos, dolor muscular, dolor de sammy, dolor de garganta y pérdida del gusto o del olfato. Los síntomas pueden ser de leves a graves y pueden manifestarse hasta dos semanas después de la exposición al virus.     La práctica del autoaislamiento y la cuarentena ayuda a proteger tanto al público bryan a moore fany al evitar la exposición a personas que tienen o podrían tener serafin enfermedad contagiosa. Siga las siguientes medidas  de prevención conforme a las pautas de los CDC:    CUÁNDO INTERRUMPIR EL AISLAMIENTO: Las personas con la COVID-19 o  cualquier otra enfermedad respiratoria infecciosa que presenten síntomas y que hayan recibido indicaciones de cuidarse en moore propio hogar podrán interrumpir el aislamiento domiciliario si se cumplen las siguientes condiciones:  · Gil transcurrido al menos 1 día (24 horas) desde la recuperación, definida bryan la desaparición de la fiebre sin el uso de medicamentos destinados a reducirla; Y  · Gil emile los síntomas respiratorios (por ejemplo, tos, falta de aire); Y  · Gil transcurrido al menos 10 días desde que aparecieron los síntomas por primera vez y no ortiz habido ninguna enfermedad subsiguiente.    CONTROLE VIKA SÍNTOMAS: Si moore enfermedad está empeorando, busque atención médica inmediata. Si tiene serafin emergencia médica y necesita llamar al 911, notifique al personal de despacho que tiene o que lo están evaluando por tratarse de un dorothy sospechoso o confirmado de la COVID-19 u otra enfermedad respiratoria infecciosa. Use serafin mascarilla si es posible.    RESTRICCIÓN DE ACTIVIDADES: Restrinja las actividades fuera de moore hogar,  excepto para recibir atención médica. No debe ir al trabajo, a la escuela ni a áreas públicas. Evite el uso de transporte público, transporte compartido o taxis.    CITAS MÉDICAS PROGRAMADAS: Notifique a moore proveedor que tiene o que lo están evaluando por tratarse de un dorothy sospechoso o confirmado de la COVID-19 u otra enfermedad respiratoria infecciosa. Rison ayudará a la oficina del proveedor de atención médica a cuidar de usted de manera burton y a evitar que otras personas se infecten o se expongan.    MASCARILLAS, cuándo usarlas: Siempre que esté fuera de moore casa o cerca de otras personas o mascotas. Si no puede usar serafin mascarilla, mantenga serafin distancia mínima de 6 pies de los demás.  ENTORNO DONDE VIVE: Permanezca en serafin habitación separada de otras personas y  mascotas. Si es posible, use un baño separado, pídales a otras personas que cuiden de bhanu mascotas y evite compartir artículos del hogar. Cualquier artículo que use debe lavarse eb con agua y jabón. Limpie todas las superficies “de mucho uso” todos los días. Use un spray de limpieza doméstico o serafin toallita, de acuerdo con las instrucciones de la etiqueta. Las superficies “de mucho uso” incluyen (entre otras cosas) mesones, mesas, pomos de david, accesorios de baño, inodoros, teléfonos, teclados, tabletas y mesitas de noche.    LAVADO DE JENNIFER: Lávese las jennifer con frecuencia con agua y jabón viral al menos 20 segundos, especialmente después de sonarse la nariz, toser o estornudar; después de ir al baño; antes y después de interactuar con mascotas; y antes y después de las comidas o de preparar alimentos. Si las jennifer están visiblemente sucias, use agua y jabón. Si no hay agua y jabón disponible, use un desinfectante para jennifer a base de alcohol con al menos 60 % de alcohol. Evite tocarse los ojos, la nariz y la boca antes de lavarse las jennifer. Cúbrase la boca y la nariz con un pañuelo cuando estornude o tosa. Tire los pañuelos usados en un bote de basura con bolsa. Lávese las jennifer inmediatamente.    AUTOCONTROL ACTIVO/FACILITADO: Siga las instrucciones proporcionadas por el departamento de bettye o los profesionales médicos a nivel local, según corresponda.  Cuando trabaje con moore departamento de bettye local, verifique bhanu horarios de disponibilidad.    CONDADO NÚMERO DE TELÉFONO   [place] [phone]   [facility] [place] [phone]   [place] [phone]     SI TIENE UN RESULTADO POSITIVO CONFIRMADO DE LA COVID-19:  Las personas que se hayan recuperado por completo de la COVID-19 pueden tener anticuerpos en moore plasma (denominado “plasma convaleciente”) que generan serafin respuesta inmune, y esto podría usarse para tratar a pacientes gravemente enfermos con  COVID-19.  [facility] está entusiasmado por comenzar a trabajar con [employer] en la recolección de plasma convaleciente de personas que se mathis recuperado de la COVID-19 bryan parte de un programa para tratar a pacientes infectados con el virus. Hillary “fármaco nuevo en fase de investigación clínica de emergencia” aprobado por la Administración de Alimentos y Medicamentos (Food and Drug Administration, FDA) es un producto  hemoderivado especial con anticuerpos que pueden brindar a los pacientes un refuerzo adicional para combatir el virus.    Para ser elegible para donar plasma convaleciente, debe kacie tenido un diagnóstico previo de COVID-19 documentado a partir de serafin prueba de laboratorio (o un resultado positivo de anticuerpos de SARS-CoV-2) y cumplir con requisitos de elegibilidad adicionales.  Si le interesa donar plasma convaleciente o tiene preguntas, comuníquese con el coordinador del proyecto Plasma Convaleciente de [place] al [phone] o por correo electrónico a [email or web address].    Diet Plan: Discussed  Activity Level: Discussed  Confirmed Follow up Appointment: Patient to Call and Schedule Appointment  Confirmed Symptoms Management: Discussed  Medication Reconciliation Updated: Yes    I understand that a diet low in cholesterol, fat, and sodium is recommended for good health. Unless I have been given specific instructions below for another diet, I accept this instruction as my diet prescription.   Other diet: Regular, healthy diet    Special Instructions:     COVID-19  COVID-19 is a respiratory infection that is caused by a virus called severe acute respiratory syndrome coronavirus 2 (SARS-CoV-2). The disease is also known as coronavirus disease or novel coronavirus. In some people, the virus may not cause any symptoms. In others, it may cause a serious infection. The infection can get worse quickly and can lead to complications, such as:  · Pneumonia, or infection of the lungs.  · Acute  respiratory distress syndrome or ARDS. This is fluid build-up in the lungs.  · Acute respiratory failure. This is a condition in which there is not enough oxygen passing from the lungs to the body.  · Sepsis or septic shock. This is a serious bodily reaction to an infection.  · Blood clotting problems.  · Secondary infections due to bacteria or fungus.  The virus that causes COVID-19 is contagious. This means that it can spread from person to person through droplets from coughs and sneezes (respiratory secretions).  What are the causes?  This illness is caused by a virus. You may catch the virus by:  · Breathing in droplets from an infected person's cough or sneeze.  · Touching something, like a table or a doorknob, that was exposed to the virus (contaminated) and then touching your mouth, nose, or eyes.  What increases the risk?  Risk for infection  You are more likely to be infected with this virus if you:  · Live in or travel to an area with a COVID-19 outbreak.  · Come in contact with a sick person who recently traveled to an area with a COVID-19 outbreak.  · Provide care for or live with a person who is infected with COVID-19.  Risk for serious illness  You are more likely to become seriously ill from the virus if you:  · Are 65 years of age or older.  · Have a long-term disease that lowers your body's ability to fight infection (immunocompromised).  · Live in a nursing home or long-term care facility.  · Have a long-term (chronic) disease such as:  ? Chronic lung disease, including chronic obstructive pulmonary disease or asthma  ? Heart disease.  ? Diabetes.  ? Chronic kidney disease.  ? Liver disease.  · Are obese.  What are the signs or symptoms?  Symptoms of this condition can range from mild to severe. Symptoms may appear any time from 2 to 14 days after being exposed to the virus. They include:  · A fever.  · A cough.  · Difficulty breathing.  · Chills.  · Muscle pains.  · A sore throat.  · Loss of taste  or smell.  Some people may also have stomach problems, such as nausea, vomiting, or diarrhea.  Other people may not have any symptoms of COVID-19.  How is this diagnosed?  This condition may be diagnosed based on:  · Your signs and symptoms, especially if:  ? You live in an area with a COVID-19 outbreak.  ? You recently traveled to or from an area where the virus is common.  ? You provide care for or live with a person who was diagnosed with COVID-19.  · A physical exam.  · Lab tests, which may include:  ? A nasal swab to take a sample of fluid from your nose.  ? A throat swab to take a sample of fluid from your throat.  ? A sample of mucus from your lungs (sputum).  ? Blood tests.  · Imaging tests, which may include, X-rays, CT scan, or ultrasound.  How is this treated?  At present, there is no medicine to treat COVID-19. Medicines that treat other diseases are being used on a trial basis to see if they are effective against COVID-19.  Your health care provider will talk with you about ways to treat your symptoms. For most people, the infection is mild and can be managed at home with rest, fluids, and over-the-counter medicines.  Treatment for a serious infection usually takes places in a hospital intensive care unit (ICU). It may include one or more of the following treatments. These treatments are given until your symptoms improve.  · Receiving fluids and medicines through an IV.  · Supplemental oxygen. Extra oxygen is given through a tube in the nose, a face mask, or a osman.  · Positioning you to lie on your stomach (prone position). This makes it easier for oxygen to get into the lungs.  · Continuous positive airway pressure (CPAP) or bi-level positive airway pressure (BPAP) machine. This treatment uses mild air pressure to keep the airways open. A tube that is connected to a motor delivers oxygen to the body.  · Ventilator. This treatment moves air into and out of the lungs by using a tube that is placed in  your windpipe.  · Tracheostomy. This is a procedure to create a hole in the neck so that a breathing tube can be inserted.  · Extracorporeal membrane oxygenation (ECMO). This procedure gives the lungs a chance to recover by taking over the functions of the heart and lungs. It supplies oxygen to the body and removes carbon dioxide.  Follow these instructions at home:  Lifestyle  · If you are sick, stay home except to get medical care. Your health care provider will tell you how long to stay home. Call your health care provider before you go for medical care.  · Rest at home as told by your health care provider.  · Do not use any products that contain nicotine or tobacco, such as cigarettes, e-cigarettes, and chewing tobacco. If you need help quitting, ask your health care provider.  · Return to your normal activities as told by your health care provider. Ask your health care provider what activities are safe for you.  General instructions  · Take over-the-counter and prescription medicines only as told by your health care provider.  · Drink enough fluid to keep your urine pale yellow.  · Keep all follow-up visits as told by your health care provider. This is important.  How is this prevented?    There is no vaccine to help prevent COVID-19 infection. However, there are steps you can take to protect yourself and others from this virus.  To protect yourself:   · Do not travel to areas where COVID-19 is a risk. The areas where COVID-19 is reported change often. To identify high-risk areas and travel restrictions, check the CDC travel website: wwwnc.cdc.gov/travel/notices  · If you live in, or must travel to, an area where COVID-19 is a risk, take precautions to avoid infection.  ? Stay away from people who are sick.  ? Wash your hands often with soap and water for 20 seconds. If soap and water are not available, use an alcohol-based hand .  ? Avoid touching your mouth, face, eyes, or nose.  ? Avoid going out in  public, follow guidance from your state and local health authorities.  ? If you must go out in public, wear a cloth face covering or face mask.  ? Disinfect objects and surfaces that are frequently touched every day. This may include:  § Counters and tables.  § Doorknobs and light switches.  § Sinks and faucets.  § Electronics, such as phones, remote controls, keyboards, computers, and tablets.  To protect others:  If you have symptoms of COVID-19, take steps to prevent the virus from spreading to others.  · If you think you have a COVID-19 infection, contact your health care provider right away. Tell your health care team that you think you may have a COVID-19 infection.  · Stay home. Leave your house only to seek medical care. Do not use public transport.  · Do not travel while you are sick.  · Wash your hands often with soap and water for 20 seconds. If soap and water are not available, use alcohol-based hand .  · Stay away from other members of your household. Let healthy household members care for children and pets, if possible. If you have to care for children or pets, wash your hands often and wear a mask. If possible, stay in your own room, separate from others. Use a different bathroom.  · Make sure that all people in your household wash their hands well and often.  · Cough or sneeze into a tissue or your sleeve or elbow. Do not cough or sneeze into your hand or into the air.  · Wear a cloth face covering or face mask.  Where to find more information  · Centers for Disease Control and Prevention: www.cdc.gov/coronavirus/2019-ncov/index.html  · World Health Organization: www.who.int/health-topics/coronavirus  Contact a health care provider if:  · You live in or have traveled to an area where COVID-19 is a risk and you have symptoms of the infection.  · You have had contact with someone who has COVID-19 and you have symptoms of the infection.  Get help right away if:  · You have trouble  breathing.  · You have pain or pressure in your chest.  · You have confusion.  · You have bluish lips and fingernails.  · You have difficulty waking from sleep.  · You have symptoms that get worse.  These symptoms may represent a serious problem that is an emergency. Do not wait to see if the symptoms will go away. Get medical help right away. Call your local emergency services (911 in the [place] Do not drive yourself to the hospital. Let the emergency medical personnel know if you think you have COVID-19.  Summary  · COVID-19 is a respiratory infection that is caused by a virus. It is also known as coronavirus disease or novel coronavirus. It can cause serious infections, such as pneumonia, acute respiratory distress syndrome, acute respiratory failure, or sepsis.  · The virus that causes COVID-19 is contagious. This means that it can spread from person to person through droplets from coughs and sneezes.  · You are more likely to develop a serious illness if you are 65 years of age or older, have a weak immunity, live in a nursing home, or have chronic disease.  · There is no medicine to treat COVID-19. Your health care provider will talk with you about ways to treat your symptoms.  · Take steps to protect yourself and others from infection. Wash your hands often and disinfect objects and surfaces that are frequently touched every day. Stay away from people who are sick and wear a mask if you are sick.  This information is not intended to replace advice given to you by your health care provider. Make sure you discuss any questions you have with your health care provider.    COVID-19  La COVID-19 es serafin infección respiratoria causada por un virus llamado coronavirus tipo 2 causante del síndrome respiratorio desire grave (SARS-CoV-2). La enfermedad también se conoce bryan enfermedad por coronavirus o nuevo coronavirus. En algunas personas, el virus puede no ocasionar síntomas. En otras, puede producir serafin infección  grave. La infección puede empeorar rápidamente y causar complicaciones, bryan:  · Neumonía o infección en los pulmones.  · Síndrome de dificultad respiratoria aguda o SDRA. Se trata de la acumulación de líquido en los pulmones.  · Insuficiencia respiratoria aguda. Se trata de serafin afección en la que no pasa suficiente oxígeno de los pulmones al cuerpo.  · Sepsis o choque séptico. Se trata de serafin reacción grave del cuerpo ante serafin infección.  · Problemas de coagulación.  · Infecciones secundarias debido a bacterias u hongos.  El virus que causa la COVID-19 es contagioso. Brantley significa que puede transmitirse de serafin persona a otra a través de las gotitas de saliva de la tos y de los estornudos (secreciones respiratorias).  ¿Cuáles son las causas?  Esta enfermedad es causada por un virus. Usted puede contagiarse con pati virus:  · Al aspirar las gotitas que serafin persona infectada elimina al toser o estornudar.  · Al tocar algo, bryan serafin sanches o el picaportes de serafin gerardo, que estuvo expuesto al virus (contaminado) y luego tocarse la boca, nariz o los ojos.  ¿Qué incrementa el riesgo?  Riesgo de infección  Es más probable que se infecte con pati virus si:  · Vive o viaja a serafin carroll donde hay un brote de COVID-19.  · Entra en contacto con serafin persona enferma que recientemente viajó a serafin carroll con un brote de COVID-19.  · Cuida o vive con serafin persona infectada con COVID-19.  Riesgo de enfermedad grave  Es más probable que se enferme gravemente por el virus si:  · Tiene 65 años o más.  · Tiene serafin enfermedad crónica que disminuye la capacidad del cuerpo para combatir las infecciones (immunocomprometido).  · Vive en un hogar de ancianos o centro de atención a sudha plazo.  · Tiene serafin enfermedad prolongada (crónica), bryan las siguientes:  ? Enfermedad pulmonar crónica, que incluye la enfermedad pulmonar obstructiva crónica o asma.  ? Enfermedad cardíaca.  ? Diabetes.  ? Enfermedad renal crónica.  ? Enfermedad hepática.  · Es  adriano.  ¿Cuáles son los signos o síntomas?  Los síntomas de esta afección pueden ser de leves a graves. Los síntomas pueden aparecer en el término de 2 a 14 días después de kacie estado expuesto al virus. Incluyen los siguientes:  · Fiebre.  · Tos.  · Dificultad para respirar.  · Escalofríos.  · Eliane musculares.  · Dolor de garganta.  · Pérdida del gusto o el olfato.  Algunas personas también pueden tener problemas estomacales, bryan náuseas, vómitos o diarrea.  Es posible que otras personas no tengan síntomas de COVID-19.  ¿Cómo se diagnostica?  Esta afección se puede diagnosticar en función de lo siguiente:  · Shahida signos y síntomas, especialmente si:  ? Vive en serafin carroll donde hay un brote de COVID-19.  ? Viajó recientemente a serafin carroll donde el virus es frecuente.  ? Cuida o vive con serafin persona a quien se le diagnosticó COVID-19.  · Un examen físico.  · Análisis de laboratorio que pueden incluir:  ? Un hisopado nasal para gemma serafin muestra de líquido de la nariz.  ? Un hisopado de garganta para gemma serafni muestra de líquido de la garganta.  ? Serafin muestra de mucosidad de los pulmones (esputo).  ? Análisis de antoine.  · Los estudios de diagnóstico por imágenes pueden incluir radiografías, exploración por tomografía computarizada (TC) o ecografía.  ¿Cómo se trata?  En pati momento, no hay ningún medicamento para tratar la COVID-19. Los medicamentos para tratar otras enfermedades se usan a modo de ensayo para comprobar si son eficaces contra la COVID-19.  El médico le informará sobre las maneras de tratar los síntomas. En la mayoría de las personas, la infección es leve y puede controlarse en el hogar con reposo, líquidos y medicamentos de venta jadiel.  El tratamiento para serafin infección grave suele realizarse en la unidad de cuidados intensivos (UCI) de un hospital. Puede incluir bina o más de los siguientes. Estos tratamientos se administran hasta que los síntomas mejoran.  · Recibir líquidos y medicamentos a través  de serafin vía intravenosa.  · Oxígeno complementario. Para administrar oxígeno extra, se utiliza un tubo en la nariz, serafin mascarilla o serafin pardeep de oxígeno.  · Colocarlo para que se recueste boca abajo (decúbito prono). [place] facilita el ingreso de oxígeno a los pulmones.  · Uso continuo de serafin máquina de presión positiva de las vías aéreas (CPAP) o de presión positiva de las vías aéreas de dos niveles (BPAP). Pati tratamiento utiliza serafin presión de aire leve para mantener las vías respiratorias abiertas. Un tubo conectado a un motor administra oxígeno al cuerpo.  · Respirador. Pati tratamiento mueve el aire dentro y fuera de los pulmones mediante el uso de un tubo que se coloca en la tráquea.  · Traqueostomía. En pati procedimiento se hace un orificio en el jasmeet para insertar un tubo de respiración.  · Oxigenación por membrana extracorpórea (OMEC). En pati procedimiento, los pulmones tienen la posibilidad de recuperarse al asumir las funciones del corazón y los pulmones. Suministra oxígeno al cuerpo y elimina el dióxido de carbono.  Siga estas instrucciones en moore casa:  Estilo de tessa  · Si está enfermo, quédese en moore casa, excepto para obtener atención médica. El médico le indicará cuánto tiempo debe quedarse en casa. Llame al médico antes de buscar atención médica.  · Yennifer reposo en moore casa bryan se lo haya indicado el médico.  · No consuma ningún producto que contenga nicotina o tabaco, bryan cigarrillos, cigarrillos electrónicos y tabaco de mascar. Si necesita ayuda para dejar de fumar, consulte al médico.  · Retome bhanu actividades normales bryan se lo haya indicado el médico. Pregúntele al médico qué actividades son seguras para usted.  Instrucciones generales  · Use los medicamentos de venta jadiel y los recetados solamente bryan se lo haya indicado el médico.  · Reema suficiente líquido bryan para mantener la orina de color amarillo pálido.  · Concurra a todas las visitas de seguimiento bryan se lo haya indicado el  médico. [place] es importante.  ¿Cómo se farrah?    No hay ninguna vacuna que ayude a prevenir la infección por la COVID-19. Sin embargo, hay medidas que puede gemma para protegerse y proteger a otras personas de pait virus.  Para protegerse:   · No viaje a zonas donde la COVID-19 sea un riesgo. Las zonas donde se informa la presencia de la COVID-19 cambian con frecuencia. Para identificar las zonas de alto riesgo y las restricciones de viaje, consulte el sitio web de viajes de los Centers for Disease Control and Prevention (CDC) (Centros para el Control y la Prevención de Enfermedades): wwwnc.cdc.gov/travel/notices  · Si vive o debe viajar a serafin carroll donde COVID-19 es un riesgo, tome precauciones para evitar infecciones.  ? Aléjese de las personas enfermas.  ? Lávese las hilda frecuentemente con agua y jabón viral 20 segundos. Use desinfectante para hilda con alcohol si no dispone de agua y jabón.  ? Evite tocarse la boca, la sondra, los ojos o la nariz.  ? Evite salir de moore casa, siga las indicaciones de moore estado y de las autoridades sanitarias locales.  ? Si debe salir de moore casa, use un barbijo de salina o serafin mascarilla facial.  ? Desinfecte los objetos y las superficies que se tocan con frecuencia todos los días. Pueden incluir:  § Encimeras y mesas.  § Picaportes e interruptores de matthew.  § Lavabos, fregaderos y grifos.  § Aparatos electrónicos tales bryan teléfonos, controles remotos, teclados, computadoras y tabletas.  Cómo proteger a los demás:  Si tiene síntomas de la COVID-19, tome medidas para evitar que el virus se propague a otras personas.  · Si socorro que tiene serafin infección por la COVID-19, comuníquese de inmediato con moore médico. Informe al equipo de atención médica que socorro que puede tener serafin infección por la COVID-19.  · Quédese en moore casa. Salga de moore casa solo para buscar atención médica. No utilice el transporte público.  · No viaje mientras esté enfermo.  · Lávese las hilda frecuentemente con agua y  jabón viral 20 segundos. Usar desinfectante para hilda con alcohol si no dispone de agua y jabón.  · Manténgase alejado de quienes vivan con usted. Permita que los miembros de la fany sanos cuiden a los niños y las mascotas, si es posible. Si tiene que cuidar a los niños o las mascotas, lávese las hilda con frecuencia y use un barbijo. Si es posible, permanezca en moore habitación, separado de los demás. Utilice un baño diferente.  · Asegúrese de que todas las personas que viven en moore casa se laven eb las hilda y con frecuencia.  · Tosa o estornude en un pañuelo de papel o sobre moore manga o codo. No tosa o estornude al aire ni se cubra la boca o la nariz con la mano.  · Use un barbijo de salina o serafin mascarilla facial.  Dónde buscar más información  · Centers for Disease Control and Prevention (Centros para el Control y la Prevención de Enfermedades): www.cdc.gov/coronavirus/2019-ncov/index.html  · World Health Organization (Organización Mundial de la [place]): www.who.int/health-topics/coronavirus  Comuníquese con un médico si:  · Vive o ha viajado a serafin carroll donde la COVID-19 es un riesgo y tiene síntomas de infección.  · Ha tenido contacto con alguien que tiene COVID-19 y usted tiene síntomas de infección.  Solicite ayuda de inmediato si:  · Tiene dificultad para respirar.  · Siente dolor u opresión en el pecho.  · Experimenta confusión.  · Tiene las uñas de los dedos y los labios de color azulado.  · Tiene dificultad para despertarse.  · Los síntomas empeoran.  Estos síntomas pueden representar un problema grave que constituye serafin emergencia. No espere a marielle si los síntomas desaparecen. Solicite atención médica de inmediato. Comuníquese con el servicio de emergencias de moore localidad (911 en los [place]). No conduzca por bhanu propios medios hasta el hospital. Informe al personal médico de emergencias si socorro que tiene COVID-19.  Resumen  · La COVID-19 es serafin infección respiratoria causada por un virus.  También se conoce bryan enfermedad por coronavirus o nuevo coronavirus. Puede causar infecciones graves, bryan neumonía, síndrome de dificultad respiratoria aguda, insuficiencia respiratoria aguda o sepsis.  · El virus que causa la COVID-19 es contagioso. [place] significa que puede transmitirse de serafin persona a otra a través de las gotitas de saliva de la tos y de los estornudos.  · Es más probable que desarrolle serafin enfermedad grave si tiene 65 años o más, tiene un sistema inmunitario débil, vive en un hogar de ancianos o tiene enfermedad crónica.  · No hay ningún medicamento para tratar la COVID-19. El médico le informará sobre las maneras de tratar los síntomas.  · [place] medidas para protegerse y proteger a los demás contra las infecciones. Lávese las hilda con frecuencia y desinfecte los objetos y las superficies que se tocan con frecuencia todos los días. Manténgase alejado de las personas que estén enfermas y use un barbijo si está enfermo.  Esta información no tiene bryan fin reemplazar el consejo del médico. Asegúrese de hacerle al médico cualquier pregunta que tenga.    POSITIVE COVID 19 TEST    1. Stay home and continue self isolation until:  · At least ten (10) days have passed since symptoms first appeared AND    · At least 24 hours have passed from last fever without the use of fever-reducing medications AND    · Symptoms have improved (eg: cough or shortness of breath)    2. Even after the above are met, maintain social distance from others (at least 6 feet) and practice frequent hand washing.    3. Wear a face mask in public places.    4. NOTE: If you need to be seen at [facility]:    · At least fourteen (14) days must pass since symptoms appeared AND     · At least 24 hours have passed from last fever without the use of fever-reducing medications AND    · Symptoms have improved (eg: cough or shortness of breath)    Prevent the Spread of COVID-19 if You Are Sick  If you are sick with COVID-19 or think  you might have COVID-19, follow the steps below to help protect other people in your home and community.  Stay home except to get medical care.  · Stay home. Most people with COVID-19 have mild illness and are able to recover at home without medical care. Do not leave your home, except to get medical care. Do not visit public areas.  · Take care of yourself. Get rest and stay hydrated.  · Get medical care when needed. Call your doctor before you go to their office for care. But, if you have trouble breathing or other concerning symptoms, call 911 for immediate help.  · Avoid public transportation, ride-sharing, or taxis.  Separate yourself from other people and pets in your home.  · As much as possible, stay in a specific room and away from other people and pets in your home. Also, you should use a separate bathroom, if available. If you need to be around other people or animals in or outside of the home, wear a cloth face covering.  ? See COVID-19 and Animals if you have questions about pets: https://www.cdc.gov/coronavirus/2019-ncov/faq.html#KZARQ70gyxptav  Monitor your symptoms.  · Common symptoms of COVID-19 include fever and cough. Trouble breathing is a more serious symptom that means you should get medical attention.  · Follow care instructions from your healthcare provider and local health department. Your local health authorities will give instructions on checking your symptoms and reporting information.  If you develop emergency warning signs for COVID-19 get medical attention immediately.   Emergency warning signs include*:  · Trouble breathing  · Persistent pain or pressure in the chest  · New confusion or not able to be woken  · Bluish lips or face  *This list is not all inclusive. Please consult your medical provider for any other symptoms that are severe or concerning to you.  Call 911 if you have a medical emergency. If you have a medical emergency and need to call 911, notify the  that you  have or think you might have, COVID-19. If possible, put on a facemask before medical help arrives.  Call ahead before visiting your doctor.  · Call ahead. Many medical visits for routine care are being postponed or done by phone or telemedicine.  · If you have a medical appointment that cannot be postponed, call your doctor's office. This will help the office protect themselves and other patients.  If you are sick, wear a cloth covering over your nose and mouth.  · You should wear a cloth face covering over your nose and mouth if you must be around other people or animals, including pets (even at home).  · You don't need to wear the cloth face covering if you are alone. If you can't put on a cloth face covering (because of trouble breathing for example), cover your coughs and sneezes in some other way. Try to stay at least 6 feet away from other people. This will help protect the people around you.  Note: During the COVID-19 pandemic, medical grade facemasks are reserved for healthcare workers and some first responders. You may need to make a cloth face covering using a scarf or bandana.  Cover your coughs and sneezes.  · Cover your mouth and nose with a tissue when you cough or sneeze.  · Throw used tissues in a lined trash can.  · Immediately wash your hands with soap and water for at least 20 seconds. If soap and water are not available, clean your hands with an alcohol-based hand  that contains at least 60% alcohol.  Clean your hands often.  · Wash your hands often with soap and water for at least 20 seconds. This is especially important after blowing your nose, coughing, or sneezing; going to the bathroom; and before eating or preparing food.  · Use hand  if soap and water are not available. Use an alcohol-based hand  with at least 60% alcohol, covering all surfaces of your hands and rubbing them together until they feel dry.  · Soap and water are the best option, especially if your  "hands are visibly dirty.  · Avoid touching your eyes, nose, and mouth with unwashed hands.  Avoid sharing personal household items.  · Do not share dishes, drinking glasses, cups, eating utensils, towels, or bedding with other people in your home.  · Wash these items thoroughly after using them with soap and water or put them in the .  Clean all \"high-touch\" surfaces everyday.  · Clean and disinfect high-touch surfaces in your \"sick room\" and bathroom. Let someone else clean and disinfect surfaces in common areas, but not your bedroom and bathroom.  · If a caregiver or other person needs to clean and disinfect a sick person's bedroom or bathroom, they should do so on an as-needed basis. The caregiver/other person should wear a mask and wait as long as possible after the sick person has used the bathroom.  High-touch surfaces include phones, remote controls, counters, tabletops, doorknobs, bathroom fixtures, toilets, keyboards, tablets, and bedside tables.  · Clean and disinfect areas that may have blood, stool, or body fluids on them.  · Use household  and disinfectants. Clean the area or item with soap and water or another detergent if it is dirty. Then use a household disinfectant.  ? Be sure to follow the instructions on the label to ensure safe and effective use of the product. Many products recommend keeping the surface wet for several minutes to ensure germs are killed. Many also recommend precautions such as wearing gloves and making sure you have good ventilation during use of the product.  ? Most EPA-registered household disinfectants should be effective.  How to discontinue home isolation  · People with COVID-19 who have stayed home (home isolated) can stop home isolation under the following conditions:  ? If you will not have a test to determine if you are still contagious, you can leave home after these three things have happened:  § You have had no fever for at least 72 hours (that is " three full days of no fever without the use of medicine that reduces fevers) AND  § other symptoms have improved (for example, when your cough or shortness of breath has improved) AND  § at least 10 days have passed since your symptoms first appeared.  ? If you will be tested to determine if you are still contagious, you can leave home after these three things have happened:  § You no longer have a fever (without the use of medicine that reduces fevers) AND  § other symptoms have improved (for example, when your cough or shortness of breath has improved) AND  § you received two negative tests in a row, 24 hours apart. Your doctor will follow CDC guidelines.  In all cases, follow the guidance of your healthcare provider and local health department. The decision to stop home isolation should be made in consultation with your healthcare provider and state and local health departments. Local decisions depend on local circumstances.  cdc.gov/coronavirus  05/03/2020  This information is not intended to replace advice given to you by your health care provider. Make sure you discuss any questions you have with your health care provider.    Prevención de la propagación de la COVID-19 si está enfermo  Prevent the Spread of COVID-19 if You Are Sick  Si tiene COVID-19 o piensa que podría tener esta enfermedad, siga los pasos a continuación para ayudar a proteger a otras personas de moore hogar y moore comunidad.  Quédese en moore casa, excepto para obtener atención médica.  · Quédese en moore casa. La mayoría de las personas con COVID-19 tienen serafin enfermedad leve y pueden recuperarse en moore hogar sin atención médica. No salga de moore casa, excepto para recibir atención médica. No visite lugares públicos.  · Cuídese. Descanse y manténgase hidratado.  · Solicite atención médica cuando sea necesario. Llame al médico antes de ir a moore consultorio para recibir atención. No obstante, si tiene problemas para respirar u otros síntomas que le  preocupen, llame al 911 para obtener ayuda inmediata.  · Evite el transporte público, compartir vehículos o gemma taxis.  Sepárese de otras personas y mascotas en moore hogar.  · En la mayor medida posible, permanezca en serafin habitación específica y alejado de otras personas y mascotas en moore hogar. Además, debe utilizar un baño aparte, si es posible. Si necesita estar cerca de otras personas o animales dentro o fuera de la casa, use un barbijo de salina.  ? Si tiene preguntas sobre las mascotas, consulte la sección sobre la COVID-19 y los animales: https://www.cdc.gov/coronavirus/2019- ncov/faq.html#EMGNI81tlruwaq  Controle bhanu síntomas.  · Los síntomas frecuentes de la COVID-19 incluyen fiebre y tos. La dificultad para respirar es un síntoma más grave que significa que debe buscar atención médica.  · Siga las instrucciones del médico y del departamento de bettye local. Las autoridades de bettye locales le darán instrucciones para controlar bhanu síntomas y notificar la información.  Si presenta signos de advertencia de emergencia relacionados con la COVID-19, solicite atención médica de inmediato.   Entre los signos de advertencia de emergencia, se incluyen los siguientes*:  · Problemas respiratorios  · Dolor u opresión persistente en el pecho  · Nueva confusión o no poder ser despertado  · Labios o aaron azulados  *Esta lista no es completa. Consulte al médico si tiene otros síntomas que aram graves o le preocupen.  Llame al 911 si tiene serafin emergencia médica. Si tiene serafin emergencia médica y debe llamar al 911, notifique al operador que tiene o piensa que podría tener COVID-19. Si es posible, póngase un barbijo antes de que llegue la ayuda médica.  Llame con anticipación antes de visitar al médico.  · Llame con anticipación. Muchas visitas médicas para la atención de rutina se están posponiendo o se realizan por teléfono o telemedicina.  · Si tiene serafin visita médica que no se puede posponer, llame al consultorio del  médico. [place] ayudará a que quienes trabajan en el consultorio puedan protegerse y proteger a otros pacientes.  Si está enfermo, use un barbijo de salina sobre la nariz y la boca.  · Debe usar un barbijo de salina sobre la nariz y la boca si debe estar cerca de otras personas o animales, incluidas las mascotas (incluso en moore casa).  · No es necesario que use el barbijo de salina si está solo. Si no puede ponerse un barbijo de salina (debido a que tiene dificultad para respirar, por ejemplo), cúbrase de algún otro modo cuando tosa o estornude. Trate de mantenerse al menos a serafin distancia de 6 pies (1.80 m) de las demás personas. [place] ayudará a proteger a las personas que lo rodean.  Nota: Viral la pandemia de COVID-19, los barbijos de asaf médico están reservados para los trabajadores de la bettye y para algunas personas que brindan asistencia en casos de emergencias. Es posible que deba hacer moore propio barbijo de salina con un pañuelo o serafin bufanda.  Cúbrase al toser y estornudar.  · Cúbrase la boca y la nariz con un pañuelo descartable cuando tosa o estornude.  · Arroje los pañuelos descartables usados en un cesto de basura que tenga serafin bolsa.  · Inmediatamente, lávese las hilda con agua y jabón viral al menos 20 segundos. Si no dispone de agua y jabón, límpiese las hilda con un desinfectante de hilda a base de alcohol que contenga al menos un 60 % de alcohol.  Límpiese las hilda con frecuencia.  · Lávese las hilda frecuentemente con agua y jabón viral al menos 20 segundos. [place] es especialmente importante después de sonarse la nariz, toser o estornudar; ir al baño; y antes de comer o preparar alimentos.  · Utilice un desinfectante para hilda si no dispone de agua y jabón. Use un desinfectante para hilda a base de alcohol con al menos un 60 % de alcohol, cubriéndose todas las superficies de las hilda y frotándoselas hasta que se sientan secas.  · Usar agua y jabón es la mejor opción, especialmente si las hilda están  muy sucias.  · No se toque los ojos, la nariz y la boca sin antes lavarse las hilda.  Evite compartir artículos domésticos personales.  · No comparta platos, vasos, tazas, utensilios para comer, toallas ni ropa de cama con otras personas en moore casa.  · Después de usar estos elementos, lávelos eb con agua y jabón o póngalos en el lavavajillas.  Limpie todos los días todas las superficies que se toquen con frecuencia.  · Limpie y desinfecte las superficies que se toquen con frecuencia en moore “habitación de enfermo” y el baño. Deje que otra persona limpie y desinfecte las superficies de las zonas comunes, caleb no moore habitación y baño.  · Si un cuidador u otra persona debe limpiar y desinfectar la habitación o el baño de serafin persona enferma, debe hacerlo según sea necesario. El cuidador o la otra persona deben usar un barbijo y esperar todo lo que sea posible después de que la persona enferma haya usado el baño.  Las superficies que se tocan con frecuencia incluyen teléfonos, controles remotos, encimeras, mesadas, picaportes, artefactos del baño, inodoros, teclados, tabletas y mesas de matthew.  · Limpie y desinfecte las zonas que puedan tener antoine, heces o líquidos corporales en moore superficie.  · Use limpiadores y desinfectantes domésticos. Limpie la carroll o el elemento con agua y jabón o con otro detergente si está sucio. Luego, use un desinfectante de uso doméstico.  ? No olvide seguir las instrucciones de la etiqueta para asegurarse de usar el producto de manera burton y eficaz. En el dorothy de muchos productos, se recomienda mantener la superficie húmeda viral algunos minutos para asegurarse de que se destruyan los gérmenes. En muchos otros casos, también se recomiendan precauciones bryan el uso de guantes y asegurarse de tener buena ventilación viral el uso del producto.  ? La mayoría de los desinfectantes domésticos registrados en la Environmental Protection Agency (EPA) (Agencia de Protección Ambiental) deberían  ser eficaces.  Cómo suspender el aislamiento en el hogar  · Las personas con COVID-19 que mathis permanecido en el hogar (aislamiento en el hogar) pueden dejar gilson aislamiento bajo las siguientes condiciones:  ? Si no se le realizará un análisis para determinar si aún puede contagiar, podrá salir de moore casa después de que hayan ocurrido estas erik cosas:  § No haya tenido fiebre viral al menos 72 horas (es decir, erik días enteros sin fiebre sin usar medicamentos para disminuir la fiebre) Y  § otros síntomas hayan emile (por ejemplo, cuando la tos o la dificultad para respirar hayan emile) Y  § hayan transcurrido al menos 10 días desde que tuvo los síntomas por primera vez.  ? Si se le realizará un análisis para determinar si aún puede contagiar, podrá salir de moore casa después de que hayan ocurrido estas erik cosas:  § Ya no tenga fiebre (sin el uso de medicamentos para disminuir la fiebre) Y  § otros síntomas hayan emile (por ejemplo, cuando la tos o la dificultad para respirar hayan emile) Y  § haya recibido dos resultados de análisis negativos consecutivos con serafin diferencia de 24 horas. Moore médico seguirá las pautas de los Centers for Control Disease and Prevention (CDC) (Centros para el Control y la Prevención de Enfermedades).  En todos los casos, siga los consejos del médico y del departamento de bettye local. La decisión de dejar el aislamiento en el hogar debe tomarse junto moore médico y el departamento estatal y local de bettye. Las decisiones locales dependen de las circunstancias locales.  cdc.gov/coronavirus  03/05/2020  Esta información no tiene bryan fin reemplazar el consejo del médico. Asegúrese de hacerle al médico cualquier pregunta que tenga.      · Is patient discharged on Warfarin / Coumadin?   No     Depression / Suicide Risk    As you are discharged from this [facility] facility, it is important to learn how to keep safe from harming yourself.    Recognize the warning signs:  · Abrupt  changes in personality, positive or negative- including increase in energy   · Giving away possessions  · Change in eating patterns- significant weight changes-  positive or negative  · Change in sleeping patterns- unable to sleep or sleeping all the time   · Unwillingness or inability to communicate  · Depression  · Unusual sadness, discouragement and loneliness  · Talk of wanting to die  · Neglect of personal appearance   · Rebelliousness- reckless behavior  · Withdrawal from people/activities they love  · Confusion- inability to concentrate     If you or a loved one observes any of these behaviors or has concerns about self-harm, here's what you can do:  · Talk about it- your feelings and reasons for harming yourself  · Remove any means that you might use to hurt yourself (examples: pills, rope, extension cords, firearm)  · Get professional help from the community (Mental Health, Substance Abuse, psychological counseling)  · Do not be alone:Call your Safe Contact- someone whom you trust who will be there for you.  · Call your local CRISIS HOTLINE 054-4753 or 788-658-5779  · Call your local Children's Mobile Crisis Response Team [place] (462) 311-2169 or www.FlowPlay  · Call the toll free National Suicide Prevention Hotlines   · National Suicide Prevention Lifeline 525-172-FIWE (2722)  · National Hope Line Network 800-SUICIDE (903-2935)

## 2021-08-31 VITALS
HEIGHT: 71 IN | TEMPERATURE: 97.7 F | HEART RATE: 71 BPM | SYSTOLIC BLOOD PRESSURE: 106 MMHG | RESPIRATION RATE: 18 BRPM | OXYGEN SATURATION: 95 % | BODY MASS INDEX: 23.89 KG/M2 | DIASTOLIC BLOOD PRESSURE: 65 MMHG | WEIGHT: 170.64 LBS

## 2021-08-31 PROCEDURE — 700102 HCHG RX REV CODE 250 W/ 637 OVERRIDE(OP): Performed by: INTERNAL MEDICINE

## 2021-08-31 PROCEDURE — 700102 HCHG RX REV CODE 250 W/ 637 OVERRIDE(OP): Performed by: HOSPITALIST

## 2021-08-31 PROCEDURE — A9270 NON-COVERED ITEM OR SERVICE: HCPCS | Performed by: INTERNAL MEDICINE

## 2021-08-31 PROCEDURE — A9270 NON-COVERED ITEM OR SERVICE: HCPCS | Performed by: HOSPITALIST

## 2021-08-31 RX ADMIN — ACETAMINOPHEN 650 MG: 325 TABLET, FILM COATED ORAL at 04:50

## 2021-08-31 RX ADMIN — BENZONATATE 100 MG: 100 CAPSULE ORAL at 04:50

## 2021-08-31 ASSESSMENT — PAIN DESCRIPTION - PAIN TYPE: TYPE: ACUTE PAIN

## 2021-08-31 NOTE — PROGRESS NOTES
[facility] arrived at 0435 to pick patient up.     Confirmed O2 available from [facility] for pt. Removed IV per protocol, helped pt collect all of his belongings and pt confirmed he had everything. Reminded pt to call [facility] when 90 minutes or so from home to coordinate home O2 delivery. Gave [facility] phone number and information to T  as well.     Pt wheeled down to [facility] van with  and myself. Pt asked for last dose of tylenol and tessalon perls for some rib cage pain and coughing, but did not want any of his other scheduled am meds.     Pt discharged.

## 2021-08-31 NOTE — PROGRESS NOTES
Report from ROSALVA Haywood.     Chastity informed me of issue with [facility]  refusing to transport pt since he is C19 positive.   Pt scheduled for different [facility] at 0500 tomorrow. Discharge paperwork printed and ready, discussed pt's D/C plan and his need to call Stephanie when 75-90 minutes from home to have them meet him at his home with home O2.     Discussed POC with pt. Pt has no current needs, A&Ox4, universal fall and safety precautions in place, care assumed.

## 2021-08-31 NOTE — DISCHARGE PLANNING
Received a Voalte message from [facility] @ [facility] stating that  had come to transport patient and had refused patient as he is COVID positive.  This RN CM called and spoke with [facility] staff re: transport.  They stated they have another  in route to pick the patient up.  Voalte message sent to [facility] to update that per [facility] they have another  in route, and provided phone number of [phone] for follow up with [facility] if needed.

## 2021-09-07 PROCEDURE — 99239 HOSP IP/OBS DSCHRG MGMT >30: CPT | Performed by: INTERNAL MEDICINE

## 2021-09-07 NOTE — DISCHARGE SUMMARY
Discharge Summary    CHIEF COMPLAINT ON ADMISSION  No chief complaint on file.      Reason for Admission  COVID PNA     Admission Date  8/15/2021    CODE STATUS  Prior    HPI & HOSPITAL COURSE  57 yo male transferred from [place] with worsening SOB dx covid 8/8/21 and b/l infiltrates and high o2 requirement. Transitioned to high flow and improved over the course of few days so transferred out of the ICU on 8/17/2021.   He completed course of dexamethasone and improved with aggressive diuresis.  He received tociluzimab on 8/16.  He was not a remdesivir candidate secondary to need for high flow oxygen.  He eventually got to the point where he was weaned down to 2L oxygen and was cleared to discharge home.  He was taken back to [place] via medical transport.     Therefore, he is discharged in good and stable condition to home with close outpatient follow-up.    The patient met 2-midnight criteria for an inpatient stay at the time of discharge.    Discharge Date  8/31/2021    FOLLOW UP ITEMS POST DISCHARGE  PCP - 2 weeks    DISCHARGE DIAGNOSES  Principal Problem:    Acute hypoxemic respiratory failure due to COVID-19 (HCC) POA: Yes  Active Problems:    Acute respiratory failure with hypoxia (HCC) POA: Yes    Hyponatremia POA: Yes    Elevated LFTs POA: Yes  Resolved Problems:    Thrombocytopenia (HCC) POA: Unknown      FOLLOW UP  No future appointments.  Primary Care Provider    Schedule an appointment as soon as possible for a visit in 1 week        MEDICATIONS ON DISCHARGE     Medication List      You have not been prescribed any medications.         Allergies  No Known Allergies    DIET  No orders of the defined types were placed in this encounter.      ACTIVITY  As tolerated.  Weight bearing as tolerated    CONSULTATIONS  Dr [facility]ward - Critical Care/Pulm    PROCEDURES  none    LABORATORY  Lab Results   Component Value Date    SODIUM 134 (L) 08/30/2021    POTASSIUM 4.2 08/30/2021    CHLORIDE 97  08/30/2021    CO2 27 08/30/2021    GLUCOSE 104 (H) 08/30/2021    BUN 26 (H) 08/30/2021    CREATININE 0.84 08/30/2021        Lab Results   Component Value Date    WBC 6.5 08/30/2021    HEMOGLOBIN 15.3 08/30/2021    HEMATOCRIT 45.4 08/30/2021    PLATELETCT 214 08/30/2021        Total time of the discharge process exceeds 35 minutes.

## 2021-09-09 DIAGNOSIS — I46.9 CARDIAC ARREST (HCC): Primary | ICD-10-CM

## 2021-09-09 LAB — EKG IMPRESSION: NORMAL

## 2022-05-27 NOTE — CARE PLAN
Problem: Knowledge Deficit - Standard  Goal: Patient and family/care givers will demonstrate understanding of plan of care, disease process/condition, diagnostic tests and medications  Outcome: Met     Problem: Psychosocial  Goal: Patient's level of anxiety will decrease  Outcome: Met  Goal: Patient's ability to verbalize feelings about condition will improve  Outcome: Met  Goal: Patient's ability to re-evaluate and adapt role responsibilities will improve  Outcome: Met  Goal: Patient and family will demonstrate ability to cope with life altering diagnosis and/or procedure  Outcome: Met  Goal: Spiritual and cultural needs incorporated into hospitalization  Outcome: Met     Problem: Communication  Goal: The ability to communicate needs accurately and effectively will improve  Outcome: Met     Problem: Discharge Barriers/Planning  Goal: Patient's continuum of care needs are met  Outcome: Met     Problem: Hemodynamics  Goal: Patient's hemodynamics, fluid balance and neurologic status will be stable or improve  Outcome: Met     Problem: Respiratory  Goal: Patient will achieve/maintain optimum respiratory ventilation and gas exchange  Outcome: Progressing  Note: Patient now on 2L of continuous oxygen.  Will be discharged home today on oxygen.       Problem: Fluid Volume  Goal: Fluid volume balance will be maintained  Outcome: Met     Problem: Risk for Aspiration  Goal: Patient's risk for aspiration will be absent or decrease  Outcome: Met     Problem: Nutrition  Goal: Patient's nutritional and fluid intake will be adequate or improve  Outcome: Met    Problem: Urinary Elimination  Goal: Establish and maintain regular urinary output  Outcome: Met     Problem: Bowel Elimination  Goal: Establish and maintain regular bowel function  Outcome: Met     Problem: Gastrointestinal Irritability  Goal: Nausea and vomiting will be absent or improve  Outcome: Met  Goal: Diarrhea will be absent or improved  Outcome: Met     Problem:  Mobility  Goal: Patient's capacity to carry out activities will improve  Outcome: Met     Problem: Self Care  Goal: Patient will have the ability to perform ADLs independently or with assistance (bathe, groom, dress, toilet and feed)  Outcome: Met     Problem: Infection - Standard  Goal: Patient will remain free from infection  Outcome: Met     Problem: Pain - Standard  Goal: Alleviation of pain or a reduction in pain to the patient’s comfort goal  Outcome: Met      The patient is Stable - Low risk of patient condition declining or worsening    Shift Goals  Clinical Goals: Titrate oxygen; set up home O2 & transport; discharge  Patient Goals: Discharge  Family Goals: No family at bedside    Progress made toward(s) clinical / shift goals:  Patient's oxygen titrated to 2L of continuous oxygen via nasal cannula.  Patient will be discharged home today with oxygen.  Patient agreed to pay $150 per month for home oxygen.  Transport to be arranged by .    Patient is not progressing towards the following goals: None       3 = A little assistance